# Patient Record
Sex: FEMALE | Race: OTHER | HISPANIC OR LATINO | URBAN - METROPOLITAN AREA
[De-identification: names, ages, dates, MRNs, and addresses within clinical notes are randomized per-mention and may not be internally consistent; named-entity substitution may affect disease eponyms.]

---

## 2017-10-23 ENCOUNTER — EMERGENCY (EMERGENCY)
Facility: HOSPITAL | Age: 32
LOS: 1 days | Discharge: HOME | End: 2017-10-23
Attending: EMERGENCY MEDICINE
Payer: MEDICAID

## 2017-10-23 VITALS
OXYGEN SATURATION: 99 % | TEMPERATURE: 99 F | SYSTOLIC BLOOD PRESSURE: 123 MMHG | HEART RATE: 94 BPM | DIASTOLIC BLOOD PRESSURE: 80 MMHG | RESPIRATION RATE: 18 BRPM

## 2017-10-23 VITALS
RESPIRATION RATE: 17 BRPM | SYSTOLIC BLOOD PRESSURE: 121 MMHG | HEART RATE: 99 BPM | TEMPERATURE: 98 F | WEIGHT: 139.99 LBS | DIASTOLIC BLOOD PRESSURE: 81 MMHG | HEIGHT: 63 IN | OXYGEN SATURATION: 98 %

## 2017-10-23 LAB
APPEARANCE UR: CLEAR — SIGNIFICANT CHANGE UP
BACTERIA # UR AUTO: ABNORMAL /HPF
BILIRUB UR-MCNC: NEGATIVE — SIGNIFICANT CHANGE UP
COLOR SPEC: YELLOW — SIGNIFICANT CHANGE UP
COMMENT - URINE: SIGNIFICANT CHANGE UP
DIFF PNL FLD: ABNORMAL
EPI CELLS # UR: SIGNIFICANT CHANGE UP /HPF
GLUCOSE UR QL: NEGATIVE — SIGNIFICANT CHANGE UP
HCG UR QL: NEGATIVE — SIGNIFICANT CHANGE UP
HYALINE CASTS # UR AUTO: ABNORMAL /LPF
KETONES UR-MCNC: ABNORMAL
LEUKOCYTE ESTERASE UR-ACNC: ABNORMAL
NITRITE UR-MCNC: NEGATIVE — SIGNIFICANT CHANGE UP
PH UR: 5 — SIGNIFICANT CHANGE UP (ref 5–8)
PROT UR-MCNC: 15
RBC CASTS # UR COMP ASSIST: ABNORMAL /HPF (ref 0–2)
SP GR SPEC: 1.02 — SIGNIFICANT CHANGE UP (ref 1.01–1.02)
UROBILINOGEN FLD QL: NEGATIVE — SIGNIFICANT CHANGE UP
WBC UR QL: SIGNIFICANT CHANGE UP /HPF (ref 0–5)

## 2017-10-23 PROCEDURE — 99285 EMERGENCY DEPT VISIT HI MDM: CPT

## 2017-10-23 PROCEDURE — 71020: CPT | Mod: 26

## 2017-10-23 PROCEDURE — 96372 THER/PROPH/DIAG INJ SC/IM: CPT

## 2017-10-23 PROCEDURE — 71046 X-RAY EXAM CHEST 2 VIEWS: CPT

## 2017-10-23 PROCEDURE — 99283 EMERGENCY DEPT VISIT LOW MDM: CPT | Mod: 25

## 2017-10-23 PROCEDURE — 81025 URINE PREGNANCY TEST: CPT

## 2017-10-23 PROCEDURE — 93005 ELECTROCARDIOGRAM TRACING: CPT

## 2017-10-23 PROCEDURE — 81001 URINALYSIS AUTO W/SCOPE: CPT

## 2017-10-23 RX ORDER — DIAZEPAM 5 MG
1 TABLET ORAL
Qty: 10 | Refills: 0 | OUTPATIENT
Start: 2017-10-23 | End: 2017-10-28

## 2017-10-23 RX ORDER — IBUPROFEN 200 MG
1 TABLET ORAL
Qty: 9 | Refills: 0 | OUTPATIENT
Start: 2017-10-23 | End: 2017-10-26

## 2017-10-23 RX ORDER — KETOROLAC TROMETHAMINE 30 MG/ML
30 SYRINGE (ML) INJECTION ONCE
Qty: 0 | Refills: 0 | Status: DISCONTINUED | OUTPATIENT
Start: 2017-10-23 | End: 2017-10-23

## 2017-10-23 RX ORDER — DIAZEPAM 5 MG
5 TABLET ORAL ONCE
Qty: 0 | Refills: 0 | Status: DISCONTINUED | OUTPATIENT
Start: 2017-10-23 | End: 2017-10-23

## 2017-10-23 RX ADMIN — Medication 5 MILLIGRAM(S): at 18:42

## 2017-10-23 RX ADMIN — Medication 30 MILLIGRAM(S): at 18:42

## 2017-10-23 NOTE — ED PROVIDER NOTE - MEDICAL DECISION MAKING DETAILS
33 y/o F presenting w/ L sided back pain and L arm numbness, w/ chest pain. Likely musculoskeletal pain, will check chest x-ray, EKG, give pain medication and re-assess.

## 2017-10-23 NOTE — ED PROVIDER NOTE - OBJECTIVE STATEMENT
33 y/o F w/ PMHx of asthma presents to ED c/o sudden on set L upper back pain w/ L arm numbness down to fingers, L face and jaw numbness x this morning. Pt also endorses some chest pain. Denies any history of similar symptoms. Pt denies sleeping on her arm or in a different position. NKDA.

## 2017-10-23 NOTE — ED ADULT TRIAGE NOTE - CHIEF COMPLAINT QUOTE
numbness on left face and left arm, started today around 6 am, similar episode happened almost 2 months ago. patient with clear speech. right abdominal pain with vomiting for 1 week.

## 2017-10-23 NOTE — ED ADULT NURSE NOTE - OBJECTIVE STATEMENT
Patient presented to er with c/o RUQ abdominal pain with vomiting x 10 days and numbness. Patient denies weakness.

## 2017-10-27 DIAGNOSIS — M79.602 PAIN IN LEFT ARM: ICD-10-CM

## 2017-10-27 DIAGNOSIS — J45.909 UNSPECIFIED ASTHMA, UNCOMPLICATED: ICD-10-CM

## 2017-10-27 DIAGNOSIS — M54.89 OTHER DORSALGIA: ICD-10-CM

## 2017-10-27 DIAGNOSIS — R07.9 CHEST PAIN, UNSPECIFIED: ICD-10-CM

## 2018-05-29 ENCOUNTER — EMERGENCY (EMERGENCY)
Facility: HOSPITAL | Age: 33
LOS: 1 days | Discharge: HOME | End: 2018-05-29
Attending: EMERGENCY MEDICINE
Payer: MEDICAID

## 2018-05-29 VITALS
WEIGHT: 130.07 LBS | HEIGHT: 63 IN | SYSTOLIC BLOOD PRESSURE: 124 MMHG | TEMPERATURE: 98 F | DIASTOLIC BLOOD PRESSURE: 82 MMHG | OXYGEN SATURATION: 99 % | RESPIRATION RATE: 20 BRPM | HEART RATE: 82 BPM

## 2018-05-29 LAB — HCG UR QL: NEGATIVE — SIGNIFICANT CHANGE UP

## 2018-05-29 PROCEDURE — 81025 URINE PREGNANCY TEST: CPT

## 2018-05-29 PROCEDURE — 71046 X-RAY EXAM CHEST 2 VIEWS: CPT

## 2018-05-29 PROCEDURE — 99282 EMERGENCY DEPT VISIT SF MDM: CPT

## 2018-05-29 PROCEDURE — 71046 X-RAY EXAM CHEST 2 VIEWS: CPT | Mod: 26

## 2018-05-29 PROCEDURE — 99283 EMERGENCY DEPT VISIT LOW MDM: CPT | Mod: 25

## 2018-05-29 RX ORDER — IBUPROFEN 200 MG
600 TABLET ORAL ONCE
Qty: 0 | Refills: 0 | Status: COMPLETED | OUTPATIENT
Start: 2018-05-29 | End: 2018-05-29

## 2018-05-29 RX ORDER — DIAZEPAM 5 MG
1 TABLET ORAL
Qty: 9 | Refills: 0 | OUTPATIENT
Start: 2018-05-29 | End: 2018-05-31

## 2018-05-29 RX ORDER — ACETAMINOPHEN 500 MG
650 TABLET ORAL ONCE
Qty: 0 | Refills: 0 | Status: COMPLETED | OUTPATIENT
Start: 2018-05-29 | End: 2018-05-29

## 2018-05-29 RX ORDER — IBUPROFEN 200 MG
1 TABLET ORAL
Qty: 20 | Refills: 0 | OUTPATIENT
Start: 2018-05-29 | End: 2018-06-02

## 2018-05-29 RX ORDER — DIAZEPAM 5 MG
5 TABLET ORAL ONCE
Qty: 0 | Refills: 0 | Status: DISCONTINUED | OUTPATIENT
Start: 2018-05-29 | End: 2018-05-29

## 2018-05-29 RX ADMIN — Medication 600 MILLIGRAM(S): at 11:24

## 2018-05-29 RX ADMIN — Medication 5 MILLIGRAM(S): at 11:24

## 2018-05-29 RX ADMIN — Medication 600 MILLIGRAM(S): at 12:06

## 2018-05-29 RX ADMIN — Medication 650 MILLIGRAM(S): at 12:06

## 2018-05-29 RX ADMIN — Medication 650 MILLIGRAM(S): at 11:24

## 2018-05-29 NOTE — ED PROVIDER NOTE - OBJECTIVE STATEMENT
33F no pmhx p/w right back pain x 2 days. Started after waking up. sharp pain. worse with shoulder ranging. worse with deep inspiration. no fall or trauma. no CP/SOb. no lightheadedness or dizziness. Had similar symptoms in past but less intense and self resolving. Patient tried oxycodone from a friend with mild relief. no N/V. LMP 5/4/18. no smoking. no recent travel or sx. no leg symptoms. no OCP.

## 2018-05-29 NOTE — ED PROVIDER NOTE - MEDICAL DECISION MAKING DETAILS
33F p/w atraumtic right scauplar pain. s/sx consistent with MSK etiology. will get XR to evaluate for underlying lung pathology. PERC negative. UCg.

## 2018-05-29 NOTE — ED PROVIDER NOTE - MUSCULOSKELETAL MINIMAL EXAM
right back pain. palpable scapular pain on palpation. right upper extrmeity FROM. neurovascularly intact. pulses intact. skin is intact with evidence of trauma or infection.

## 2018-10-01 ENCOUNTER — OUTPATIENT (OUTPATIENT)
Dept: OUTPATIENT SERVICES | Facility: HOSPITAL | Age: 33
LOS: 1 days | End: 2018-10-01
Payer: MEDICAID

## 2018-10-01 PROCEDURE — G9001: CPT

## 2018-10-12 ENCOUNTER — EMERGENCY (EMERGENCY)
Facility: HOSPITAL | Age: 33
LOS: 1 days | Discharge: HOME | End: 2018-10-12
Attending: EMERGENCY MEDICINE
Payer: MEDICAID

## 2018-10-12 VITALS
HEIGHT: 63 IN | DIASTOLIC BLOOD PRESSURE: 78 MMHG | HEART RATE: 89 BPM | OXYGEN SATURATION: 99 % | TEMPERATURE: 99 F | RESPIRATION RATE: 16 BRPM | SYSTOLIC BLOOD PRESSURE: 114 MMHG | WEIGHT: 134.92 LBS

## 2018-10-12 LAB — HCG SERPL-ACNC: <1 MIU/ML — SIGNIFICANT CHANGE UP

## 2018-10-12 PROCEDURE — 99284 EMERGENCY DEPT VISIT MOD MDM: CPT | Mod: 25

## 2018-10-12 PROCEDURE — 96375 TX/PRO/DX INJ NEW DRUG ADDON: CPT

## 2018-10-12 PROCEDURE — 96372 THER/PROPH/DIAG INJ SC/IM: CPT | Mod: XU

## 2018-10-12 PROCEDURE — 99285 EMERGENCY DEPT VISIT HI MDM: CPT | Mod: 25

## 2018-10-12 PROCEDURE — 96374 THER/PROPH/DIAG INJ IV PUSH: CPT

## 2018-10-12 PROCEDURE — 84702 CHORIONIC GONADOTROPIN TEST: CPT

## 2018-10-12 RX ORDER — SODIUM CHLORIDE 9 MG/ML
1000 INJECTION INTRAMUSCULAR; INTRAVENOUS; SUBCUTANEOUS ONCE
Qty: 0 | Refills: 0 | Status: COMPLETED | OUTPATIENT
Start: 2018-10-12 | End: 2018-10-12

## 2018-10-12 RX ORDER — METOCLOPRAMIDE HCL 10 MG
10 TABLET ORAL ONCE
Qty: 0 | Refills: 0 | Status: COMPLETED | OUTPATIENT
Start: 2018-10-12 | End: 2018-10-12

## 2018-10-12 RX ORDER — SUMATRIPTAN SUCCINATE 4 MG/.5ML
6 INJECTION, SOLUTION SUBCUTANEOUS ONCE
Qty: 0 | Refills: 0 | Status: COMPLETED | OUTPATIENT
Start: 2018-10-12 | End: 2018-10-12

## 2018-10-12 RX ORDER — KETOROLAC TROMETHAMINE 30 MG/ML
30 SYRINGE (ML) INJECTION ONCE
Qty: 0 | Refills: 0 | Status: DISCONTINUED | OUTPATIENT
Start: 2018-10-12 | End: 2018-10-12

## 2018-10-12 RX ORDER — DIPHENHYDRAMINE HCL 50 MG
25 CAPSULE ORAL ONCE
Qty: 0 | Refills: 0 | Status: COMPLETED | OUTPATIENT
Start: 2018-10-12 | End: 2018-10-12

## 2018-10-12 RX ORDER — ACETAMINOPHEN 500 MG
1000 TABLET ORAL ONCE
Qty: 0 | Refills: 0 | Status: COMPLETED | OUTPATIENT
Start: 2018-10-12 | End: 2018-10-12

## 2018-10-12 RX ADMIN — Medication 25 MILLIGRAM(S): at 08:55

## 2018-10-12 RX ADMIN — Medication 30 MILLIGRAM(S): at 11:22

## 2018-10-12 RX ADMIN — SODIUM CHLORIDE 2000 MILLILITER(S): 9 INJECTION INTRAMUSCULAR; INTRAVENOUS; SUBCUTANEOUS at 08:54

## 2018-10-12 RX ADMIN — SUMATRIPTAN SUCCINATE 6 MILLIGRAM(S): 4 INJECTION, SOLUTION SUBCUTANEOUS at 11:22

## 2018-10-12 RX ADMIN — Medication 10 MILLIGRAM(S): at 08:54

## 2018-10-12 RX ADMIN — Medication 400 MILLIGRAM(S): at 08:54

## 2018-10-12 RX ADMIN — Medication 1000 MILLIGRAM(S): at 09:00

## 2018-10-12 RX ADMIN — Medication 1000 MILLIGRAM(S): at 09:24

## 2018-10-12 RX ADMIN — SUMATRIPTAN SUCCINATE 6 MILLIGRAM(S): 4 INJECTION, SOLUTION SUBCUTANEOUS at 10:52

## 2018-10-12 RX ADMIN — Medication 30 MILLIGRAM(S): at 10:52

## 2018-10-12 RX ADMIN — SODIUM CHLORIDE 1000 MILLILITER(S): 9 INJECTION INTRAMUSCULAR; INTRAVENOUS; SUBCUTANEOUS at 09:00

## 2018-10-12 NOTE — ED ADULT NURSE NOTE - OBJECTIVE STATEMENT
Pt states has frontal headache since last night, accompanied by nausea and vomited x 4  Denies trauma, dizziness, blurry vision.

## 2018-10-12 NOTE — ED PROVIDER NOTE - OBJECTIVE STATEMENT
33F h/o migraines mild intermittent asthma p/w 2d gradual onset over hours of severe constant diffuse HA worse b/l frontal a/w nausea few episodes nbnb emesis and photophobia similar to prior migraines. Pt didn't take any analgesia since onset bc she had nothing at home. Pt denies f/c, neck pain/stiffness, trauma, ac/ap tx, bleeding diatheses, abd pain, d/c/melena/brbpr, unexplained weight loss, family hx, change in vision/speech/gait, weakness/numbness, thunderclap onset, worst headache of life, etoh/drug use

## 2018-10-12 NOTE — ED ADULT NURSE NOTE - CHPI ED NUR SYMPTOMS NEG
no blurred vision/no change in level of consciousness/no confusion/no weakness/no loss of consciousness/no fever/no numbness/no dizziness

## 2018-10-12 NOTE — ED PROVIDER NOTE - NS ED ROS FT
Constitutional: - Fever, - Chills, - unexplained weight loss  Eyes: - Discharge, - Irritation, - Redness, - Visual changes, + Light sensitivity  EARS: - Ear Pain, - hearing loss  NOSE: - Congestion, - epistaxis  MOUTH/THROAT: - Vocal Changes, - Drooling, - Sore throat  NECK: - Lumps, - Stiffness, - Pain  CV: - Palpitations, - Chest Pain, - Edema   RESP:  - Cough, - Shortness of Breath, - Dyspnea on Exertion, - Wheezing  GI: - Diarrhea, - Constipation, - Bloody stools, +Vomiting, - Abdominal Pain  : - Dysuria, -Frequency, - Hematuria  MSK: - Myalgias, - focal weakness, - Injuries  SKIN: - Color change, - Rash  NEURO: - Change in behavior, - Dec. Alertness, + Headache, - Change in speech, - Seizure-like activity

## 2018-10-12 NOTE — ED ADULT NURSE NOTE - CAS EDN DISCHARGE ASSESSMENT
Awake/Alert and oriented to person, place and time/No adverse reaction to first time med in ED/Symptoms improved

## 2018-10-12 NOTE — ED PROVIDER NOTE - PHYSICAL EXAMINATION
PE:   GEN: Awake, alert, oriented, interactive, NAD, uncomfortable but well appearing.   HEAD: Atraumatic, normocephalic  EENT: Sclera white, conjunctiva pink, PERRLA, EOMI, no scleral injection, normal orophrynx, neck nontender with full ROM  CARDIAC: Reg rate and rhythm, S1,S2, no murmur/rub/gallop. Strong central and peripheral pulses, Brisk cap refill, no evident pedal edema.   RESP: Normal respiratory rate and effort, no resp distress, lungs cta b/l without accessory muscle use, wheeze, rhonchi, or rales.   ABD: soft, non-tender, nondistended  NEURO: AOx3, CN II-XII grossly intact without focal deficits, FTN wnl, strength sensation wnl   MSK: Moving all extremities spontaneously, no apparent deformities  SKIN: warm, dry

## 2018-10-12 NOTE — ED PROVIDER NOTE - MEDICAL DECISION MAKING DETAILS
2d gradual onset HA c/w prior HA she's intermittently had over many yrs without any red flags on hx, normal neuro exam, presentation not c/w SAH ICH meningitis IIH dural sinus thrombosis. r/o pregnancy, pain meds, ivf, reassess, likely dc with pcp and neuro f/u and return precautions.

## 2018-10-17 DIAGNOSIS — Z71.89 OTHER SPECIFIED COUNSELING: ICD-10-CM

## 2020-06-16 NOTE — ED ADULT NURSE NOTE - MUSCULOSKELETAL WDL
DISPLAY PLAN FREE TEXT Full range of motion of upper and lower extremities, no joint tenderness/swelling.

## 2022-03-30 NOTE — ED ADULT NURSE NOTE - HIV INFORMATION PROVIDED
[FreeTextEntry1] : chf/ HLD low salt diet , daily weights, yellow zones reviewed , c/w diuretic, BB , statin , CKD 4, not candidate for ACEi or ARB due to hyperkalemia in the past , cardio followup \par HTN- c/w current meds\par pcp/ cardio followup  Yes

## 2022-12-14 NOTE — ED ADULT TRIAGE NOTE - BP NONINVASIVE SYSTOLIC (MM HG)
Dr. Flores patient    LMP: 10/30/2022  MBT: O+      S/w patient- patient states that she had intermittent light brown vaginal spotting since . Patient states that this AM she began having bright red vaginal bleeding. Patient describes vaginal blood flow as light. Patient denies vaginal blood clots. Patient reports mild pelvic cramping yesterday. Patient denies pelvic pain or cramping today. Patient denies recent IC, heavy lifting, exercise, or straining with BM.        121

## 2023-07-03 NOTE — ED PROVIDER NOTE - GASTROINTESTINAL [-], MLM
no nausea Cyclophosphamide Counseling:  I discussed with the patient the risks of cyclophosphamide including but not limited to hair loss, hormonal abnormalities, decreased fertility, abdominal pain, diarrhea, nausea and vomiting, bone marrow suppression and infection. The patient understands that monitoring is required while taking this medication.

## 2024-09-03 ENCOUNTER — INPATIENT (INPATIENT)
Facility: HOSPITAL | Age: 39
LOS: 1 days | Discharge: HOME | DRG: 392 | End: 2024-09-05
Attending: INTERNAL MEDICINE | Admitting: INTERNAL MEDICINE
Payer: SELF-PAY

## 2024-09-03 VITALS
TEMPERATURE: 99 F | RESPIRATION RATE: 18 BRPM | DIASTOLIC BLOOD PRESSURE: 93 MMHG | HEIGHT: 64 IN | OXYGEN SATURATION: 97 % | HEART RATE: 105 BPM | WEIGHT: 180.56 LBS | SYSTOLIC BLOOD PRESSURE: 144 MMHG

## 2024-09-03 DIAGNOSIS — R11.10 VOMITING, UNSPECIFIED: ICD-10-CM

## 2024-09-03 DIAGNOSIS — J45.909 UNSPECIFIED ASTHMA, UNCOMPLICATED: ICD-10-CM

## 2024-09-03 DIAGNOSIS — R10.9 UNSPECIFIED ABDOMINAL PAIN: ICD-10-CM

## 2024-09-03 DIAGNOSIS — Z29.9 ENCOUNTER FOR PROPHYLACTIC MEASURES, UNSPECIFIED: ICD-10-CM

## 2024-09-03 LAB
ALBUMIN SERPL ELPH-MCNC: 4.4 G/DL — SIGNIFICANT CHANGE UP (ref 3.5–5)
ALP SERPL-CCNC: 77 U/L — SIGNIFICANT CHANGE UP (ref 40–120)
ALT FLD-CCNC: 31 U/L DA — SIGNIFICANT CHANGE UP (ref 10–60)
ANION GAP SERPL CALC-SCNC: 7 MMOL/L — SIGNIFICANT CHANGE UP (ref 5–17)
APPEARANCE UR: CLEAR — SIGNIFICANT CHANGE UP
AST SERPL-CCNC: 13 U/L — SIGNIFICANT CHANGE UP (ref 10–40)
BASOPHILS # BLD AUTO: 0.01 K/UL — SIGNIFICANT CHANGE UP (ref 0–0.2)
BASOPHILS NFR BLD AUTO: 0.1 % — SIGNIFICANT CHANGE UP (ref 0–2)
BILIRUB SERPL-MCNC: 0.4 MG/DL — SIGNIFICANT CHANGE UP (ref 0.2–1.2)
BILIRUB UR-MCNC: NEGATIVE — SIGNIFICANT CHANGE UP
BUN SERPL-MCNC: 12 MG/DL — SIGNIFICANT CHANGE UP (ref 7–18)
CALCIUM SERPL-MCNC: 9.6 MG/DL — SIGNIFICANT CHANGE UP (ref 8.4–10.5)
CHLORIDE SERPL-SCNC: 108 MMOL/L — SIGNIFICANT CHANGE UP (ref 96–108)
CO2 SERPL-SCNC: 25 MMOL/L — SIGNIFICANT CHANGE UP (ref 22–31)
COLOR SPEC: YELLOW — SIGNIFICANT CHANGE UP
CREAT SERPL-MCNC: 0.77 MG/DL — SIGNIFICANT CHANGE UP (ref 0.5–1.3)
DIFF PNL FLD: ABNORMAL
EGFR: 102 ML/MIN/1.73M2 — SIGNIFICANT CHANGE UP
EOSINOPHIL # BLD AUTO: 0 K/UL — SIGNIFICANT CHANGE UP (ref 0–0.5)
EOSINOPHIL NFR BLD AUTO: 0 % — SIGNIFICANT CHANGE UP (ref 0–6)
FLUAV AG NPH QL: SIGNIFICANT CHANGE UP
FLUBV AG NPH QL: SIGNIFICANT CHANGE UP
GLUCOSE SERPL-MCNC: 135 MG/DL — HIGH (ref 70–99)
GLUCOSE UR QL: NEGATIVE MG/DL — SIGNIFICANT CHANGE UP
HCG SERPL-ACNC: <1 MIU/ML — SIGNIFICANT CHANGE UP
HCT VFR BLD CALC: 40.1 % — SIGNIFICANT CHANGE UP (ref 34.5–45)
HGB BLD-MCNC: 13.9 G/DL — SIGNIFICANT CHANGE UP (ref 11.5–15.5)
IMM GRANULOCYTES NFR BLD AUTO: 0.4 % — SIGNIFICANT CHANGE UP (ref 0–0.9)
KETONES UR-MCNC: 15 MG/DL
LEUKOCYTE ESTERASE UR-ACNC: NEGATIVE — SIGNIFICANT CHANGE UP
LIDOCAIN IGE QN: 26 U/L — SIGNIFICANT CHANGE UP (ref 13–75)
LYMPHOCYTES # BLD AUTO: 0.54 K/UL — LOW (ref 1–3.3)
LYMPHOCYTES # BLD AUTO: 5.2 % — LOW (ref 13–44)
MCHC RBC-ENTMCNC: 31.9 PG — SIGNIFICANT CHANGE UP (ref 27–34)
MCHC RBC-ENTMCNC: 34.7 GM/DL — SIGNIFICANT CHANGE UP (ref 32–36)
MCV RBC AUTO: 92 FL — SIGNIFICANT CHANGE UP (ref 80–100)
MONOCYTES # BLD AUTO: 0.25 K/UL — SIGNIFICANT CHANGE UP (ref 0–0.9)
MONOCYTES NFR BLD AUTO: 2.4 % — SIGNIFICANT CHANGE UP (ref 2–14)
NEUTROPHILS # BLD AUTO: 9.54 K/UL — HIGH (ref 1.8–7.4)
NEUTROPHILS NFR BLD AUTO: 91.9 % — HIGH (ref 43–77)
NITRITE UR-MCNC: NEGATIVE — SIGNIFICANT CHANGE UP
NRBC # BLD: 0 /100 WBCS — SIGNIFICANT CHANGE UP (ref 0–0)
PH UR: 7 — SIGNIFICANT CHANGE UP (ref 5–8)
PLATELET # BLD AUTO: 230 K/UL — SIGNIFICANT CHANGE UP (ref 150–400)
POTASSIUM SERPL-MCNC: 3.7 MMOL/L — SIGNIFICANT CHANGE UP (ref 3.5–5.3)
POTASSIUM SERPL-SCNC: 3.7 MMOL/L — SIGNIFICANT CHANGE UP (ref 3.5–5.3)
PROT SERPL-MCNC: 7.8 G/DL — SIGNIFICANT CHANGE UP (ref 6–8.3)
PROT UR-MCNC: ABNORMAL MG/DL
RBC # BLD: 4.36 M/UL — SIGNIFICANT CHANGE UP (ref 3.8–5.2)
RBC # FLD: 11.7 % — SIGNIFICANT CHANGE UP (ref 10.3–14.5)
SARS-COV-2 RNA SPEC QL NAA+PROBE: SIGNIFICANT CHANGE UP
SODIUM SERPL-SCNC: 140 MMOL/L — SIGNIFICANT CHANGE UP (ref 135–145)
SP GR SPEC: 1.07 — HIGH (ref 1–1.03)
TROPONIN I, HIGH SENSITIVITY RESULT: <3 NG/L — SIGNIFICANT CHANGE UP
UROBILINOGEN FLD QL: 0.2 MG/DL — SIGNIFICANT CHANGE UP (ref 0.2–1)
WBC # BLD: 10.38 K/UL — SIGNIFICANT CHANGE UP (ref 3.8–10.5)
WBC # FLD AUTO: 10.38 K/UL — SIGNIFICANT CHANGE UP (ref 3.8–10.5)

## 2024-09-03 PROCEDURE — 74177 CT ABD & PELVIS W/CONTRAST: CPT | Mod: 26,MC

## 2024-09-03 PROCEDURE — 70450 CT HEAD/BRAIN W/O DYE: CPT | Mod: 26,MC

## 2024-09-03 PROCEDURE — 71045 X-RAY EXAM CHEST 1 VIEW: CPT | Mod: 26

## 2024-09-03 PROCEDURE — 99285 EMERGENCY DEPT VISIT HI MDM: CPT

## 2024-09-03 RX ORDER — METOCLOPRAMIDE HCL 5 MG
10 TABLET ORAL ONCE
Refills: 0 | Status: COMPLETED | OUTPATIENT
Start: 2024-09-03 | End: 2024-09-03

## 2024-09-03 RX ORDER — FAMOTIDINE 10 MG/ML
20 INJECTION INTRAVENOUS ONCE
Refills: 0 | Status: COMPLETED | OUTPATIENT
Start: 2024-09-03 | End: 2024-09-03

## 2024-09-03 RX ORDER — PANTOPRAZOLE SODIUM 40 MG
40 TABLET, DELAYED RELEASE (ENTERIC COATED) ORAL DAILY
Refills: 0 | Status: DISCONTINUED | OUTPATIENT
Start: 2024-09-03 | End: 2024-09-05

## 2024-09-03 RX ORDER — HALOPERIDOL 1 MG
5 TABLET ORAL ONCE
Refills: 0 | Status: COMPLETED | OUTPATIENT
Start: 2024-09-03 | End: 2024-09-03

## 2024-09-03 RX ORDER — ONDANSETRON 2 MG/ML
8 INJECTION, SOLUTION INTRAMUSCULAR; INTRAVENOUS EVERY 8 HOURS
Refills: 0 | Status: DISCONTINUED | OUTPATIENT
Start: 2024-09-03 | End: 2024-09-05

## 2024-09-03 RX ORDER — ENOXAPARIN SODIUM 100 MG/ML
40 INJECTION SUBCUTANEOUS EVERY 24 HOURS
Refills: 0 | Status: DISCONTINUED | OUTPATIENT
Start: 2024-09-03 | End: 2024-09-05

## 2024-09-03 RX ORDER — ACETAMINOPHEN 325 MG/1
650 TABLET ORAL EVERY 6 HOURS
Refills: 0 | Status: DISCONTINUED | OUTPATIENT
Start: 2024-09-03 | End: 2024-09-05

## 2024-09-03 RX ORDER — SODIUM CHLORIDE 9 MG/ML
2000 INJECTION INTRAMUSCULAR; INTRAVENOUS; SUBCUTANEOUS ONCE
Refills: 0 | Status: COMPLETED | OUTPATIENT
Start: 2024-09-03 | End: 2024-09-03

## 2024-09-03 RX ADMIN — Medication 104 MILLIGRAM(S): at 11:18

## 2024-09-03 RX ADMIN — FAMOTIDINE 20 MILLIGRAM(S): 10 INJECTION INTRAVENOUS at 11:18

## 2024-09-03 RX ADMIN — SODIUM CHLORIDE 2000 MILLILITER(S): 9 INJECTION INTRAMUSCULAR; INTRAVENOUS; SUBCUTANEOUS at 11:18

## 2024-09-03 RX ADMIN — Medication 5 MILLIGRAM(S): at 12:19

## 2024-09-03 NOTE — ED ADULT TRIAGE NOTE - INTERNATIONAL TRAVEL
----- Message from CINTHIA Quan sent at 10/25/2021  8:13 PM EDT -----  Good control of DM  Poor control of Lipids - LDL should be less than 70  
Pt's wife Stephon advised (hipaa)  
No

## 2024-09-03 NOTE — ED PROVIDER NOTE - PHYSICAL EXAMINATION
Afebrile, hemodynamically stable, saturating well on room air  Initially noted to be retching, on reassessment appears to be sleeping, opens eyes but frequently closes them during exam, no WOB  Head NCAT  Pupils equal, EOMI, anicteric  MMM  RRR, nml S1/S2, no m/r/g  Lungs CTAB, no w/r/r  Abd soft, mild epigastric TTP, ND, nml BS, no rebound or guarding, negative Fierro's  AAO, CN's 3-12 grossly intact, motor 5/5 in all extrems  URIBE spontaneously, no leg cyanosis or edema or calf tenderness, 2+ radial pulses  Skin warm, dry, no rashes or hives

## 2024-09-03 NOTE — ED ADULT NURSE NOTE - IN ACCORDANCE WITH [place] LAW, WE OFFER EVERY PATIENT A HEPATITIS C TEST. WOULD YOU LIKE TO BE TESTED TODAY?
Opt out Secondary Intention Text (Leave Blank If You Do Not Want): The defect will heal with secondary intention.

## 2024-09-03 NOTE — SBIRT NOTE ADULT - NSSBIRTDRGBRIEFINTDET_GEN_A_CORE
Patient was not provided with a referral to substance use treatment because not aligned with patient’s goals, patient currently receiving other medical care. Patient provided with Remote SBIRT information.   Patient was not provided with a referral to substance use treatment because not aligned with patient’s goals, patient currently receiving other medical care. Patient's substance use will be addressed during their inpatient admission. Patient provided with Remote SBIRT information.   Patient was not provided with a referral to substance use treatment because patient currently receiving other medical care. Patient's substance use will be addressed during their inpatient admission. Patient provided with Remote SBIRT information.

## 2024-09-03 NOTE — ED ADULT NURSE NOTE - NSFALLUNIVINTERV_ED_ALL_ED
Bed/Stretcher in lowest position, wheels locked, appropriate side rails in place/Call bell, personal items and telephone in reach/Instruct patient to call for assistance before getting out of bed/chair/stretcher/Non-slip footwear applied when patient is off stretcher/[place] to call system/Physically safe environment - no spills, clutter or unnecessary equipment/Purposeful proactive rounding/Room/bathroom lighting operational, light cord in reach

## 2024-09-03 NOTE — H&P ADULT - PROBLEM SELECTOR PLAN 1
p/w n/v diarrhea for one day   vitals wnl   labs wnl   CT A/P Colonic wall thickening.  likely 2/2 to  Cannibis use vs colitis.   s/p 2L haldol, reglan and zofran  c/w zofran   c/w PPI  c/w iv fluids  c/w clear diet advance as tolerated  monitor sx

## 2024-09-03 NOTE — H&P ADULT - ASSESSMENT
40 y/o female, AAOx3 with a history of Astham, regular use of THC presents for abdominal pain, vomiting and diarrhea.  CT A/P Colonic wall thickening. Patient is begin admitted for intractable vomiting likely 2/2 to Cannibis use vs colitis.    38 y/o female, AAOx3 with a history of Asthma, regular use of THC presents for abdominal pain, vomiting and diarrhea.  CT A/P Colonic wall thickening. Patient is begin admitted for intractable vomiting likely 2/2 to Cannibis use vs colitis.

## 2024-09-03 NOTE — H&P ADULT - HISTORY OF PRESENT ILLNESS
Adult 38 y/o female, AAOx3 with a history of Asthma regular use of THC presents for abdominal pain, vomiting and diarrhea.  Patient is lethargic on exam and dismissive History obtained from Mother by bedside. Mother reports patient  had multiple episodes of nonbloody yellow-colored emesis as well as multiple episodes of nonbloody diarrhea since yesterday. Associated periumbilical nondilating abdominal cramping. Unable to obtain any further information.       To note: ED reports the patient reported THC use however, on my exam she said no.

## 2024-09-03 NOTE — ED PROVIDER NOTE - IV ALTEPLASE EXCL REL HIDDEN
show CV: S1,S2  Pulmonary: Clear to auscultation bilaterally    Abdomen: Gravid abdomen  Extremities: Nontender to palpation bilaterally     EFM: 150hr, moderate variability, +accelerations, -decelerations   TOCO: Contractions q2-3 minutes   SVE: 5.5/70/-3  BSUS: Vertex

## 2024-09-03 NOTE — PATIENT PROFILE ADULT - FALL HARM RISK - RISK INTERVENTIONS

## 2024-09-03 NOTE — ED PROVIDER NOTE - CLINICAL SUMMARY MEDICAL DECISION MAKING FREE TEXT BOX
Patient with chronic unchanged chest pain and low suspicion for acute cardiopulmonary process. ECG/troponin unremarkable. No evidence of DVT or acute risk factors for this. No evidence of dissection. Character and appearance low suspicion for ovarian torsion to warrant ultrasound imaging. Abdomen benign and nonperitoneal and low suspicion for acute intra-abdominal process to warrant surgical consult, and CT _________. Character low suspicion for UTI/pyelonephritis/kidney stone _______. Symptoms may be viral in etiology by context, versus related to THC. Electrolytes unremarkable and does not appear significantly dehydrated to warrant admission at this time. Given IV fluids, Reglan with Patient with chronic unchanged chest pain and low suspicion for acute cardiopulmonary process. ECG/troponin unremarkable. No evidence of DVT or acute risk factors for this. No evidence of dissection. Character and appearance low suspicion for ovarian torsion to warrant ultrasound imaging. Abdomen benign and nonperitoneal and low suspicion for acute intra-abdominal process to warrant surgical consult, and CT unremarkable other than possible colitis - higher suspicion against this on my opinion. Character low suspicion for UTI/pyelonephritis/kidney stone and UA unremarkable. Symptoms may be viral in etiology by context, versus related to THC. Electrolytes unremarkable and does not appear significantly dehydrated to warrant admission at this time. Given IV fluids, Reglan, then Haldol with improvement and no more vomiting however still with intractable nausea and weakness and despite going to bathroom on her return intractable weakness and barely able to interact again. Admitted for further management, hydration.

## 2024-09-03 NOTE — ED PROVIDER NOTE - AVIAN FLU SYMPTOMS
Problem List Items Addressed This Visit          Cardiac/Vascular    HTN (hypertension) - Primary    Overview     -at goal today  - Current Hypertension Medications:   Hypertension Medications               amLODIPine-benazepriL (LOTREL) 5-40 mg per capsule TAKE 1 CAPSULE BY MOUTH EVERY DAY    doxazosin (CARDURA) 4 MG tablet Take 4 mg by mouth every evening.    hydroCHLOROthiazide (MICROZIDE) 12.5 mg capsule TAKE 1 CAPSULES BY MOUTH EVERY DAY    metoprolol succinate (TOPROL-XL) 100 MG 24 hr tablet TAKE 1 TABLET BY MOUTH EVERY DAY   -continue lifestyle modification with low sodium diet and exercise   -discussed hypertension disease course and importance of treating high blood pressure  -patient understood and advised of risk of untreated blood pressure.  ER precautions were given   for symptoms of hypertensive urgency and emergency.             Relevant Orders    Comprehensive Metabolic Panel    CBC Auto Differential    TSH    Lipid Panel    Hemoglobin A1C       Oncology    Prostate cancer    Overview     Prostate cancer dxd 2016. Follows with jacob Wagner, monitoring. Denies hx of RT and chemotherapy   MRI prostate 2022:   IMPRESSION:  1.  No change.  2.  PIRADS 2 - Low (clinically significant cancer is unlikely to be present).  3. Minimal bladder wall trabeculation.    Reports terminal dribbling, is on alfuzosin am and doxazosin pm.         Relevant Orders    PSA, Screening       GI    History of hepatitis C    Relevant Orders    Comprehensive Metabolic Panel    CBC Auto Differential    TSH    Lipid Panel    Hemoglobin A1C    Chronic hepatitis C without hepatic coma     Other Visit Diagnoses       Hyperlipidemia, unspecified hyperlipidemia type        Relevant Orders    Comprehensive Metabolic Panel    CBC Auto Differential    TSH    Lipid Panel    Hemoglobin A1C    Encounter for screening for malignant neoplasm of prostate        Relevant Orders    PSA, Screening    Encounter for long-term current use  of medication        Relevant Orders    Comprehensive Metabolic Panel    CBC Auto Differential    TSH    Lipid Panel    Hemoglobin A1C    PSA, Screening                Patient ID: Lloyd Williamson is a 65 y.o. male.    Chief Complaint:  follow up     Has a hx of  has a past medical history of Asthma, DDD (degenerative disc disease), lumbar, GERD (gastroesophageal reflux disease), Hepatitis, Hepatitis C, Hypertension, Prostate cancer, and Seasonal allergic rhinitis (8/17/2020).     Prostate cancer dxd 2016. Follows with Dr. Tomlin, monitoring. Denies hx of RT and chemotherapy. Reports terminal dribbling, is on alfuzosin am and doxazosin pm.    Hep C s/p curative therapy 2003    Hx of cataracts and glaucoma. Follows with Dr. Musa, optometry. Stable on latanoprost.     Overactive bladder       Colonoscopy: 2017, due in 2027     Health Maintenance Topics with due status: Not Due       Topic Last Completion Date    TETANUS VACCINE 04/02/2018    Colorectal Cancer Screening 01/20/2020    Pneumococcal Vaccines (Age 0-64) 02/03/2020    Lipid Panel 07/06/2022        ==============================================  History reviewed.   Health Maintenance Due   Topic Date Due    Hemoglobin A1c (Diabetic Prevention Screening)  Never done         Past Medical History:  Past Medical History:   Diagnosis Date    Asthma     DDD (degenerative disc disease), lumbar     GERD (gastroesophageal reflux disease)     Hepatitis     Hepatitis C     Hypertension     Prostate cancer     Seasonal allergic rhinitis 8/17/2020     Past Surgical History:   Procedure Laterality Date    COLONOSCOPY      COLONOSCOPY N/A 1/20/2020    Procedure: COLONOSCOPY;  Surgeon: He Vigil MD;  Location: [facility];  Service: Endoscopy;  Laterality: N/A;    ESOPHAGOGASTRODUODENOSCOPY      EXTRACTION OF TOOTH       Review of patient's allergies indicates:  No Known Allergies  Current Outpatient Medications on File Prior to Visit   Medication Sig Dispense  Refill    albuterol (PROVENTIL/VENTOLIN HFA) 90 mcg/actuation inhaler Inhale 2 puffs into the lungs every 6 (six) hours as needed for Wheezing. Rescue 18 g 5    amLODIPine-benazepriL (LOTREL) 5-40 mg per capsule TAKE 1 CAPSULE BY MOUTH EVERY DAY 90 capsule 1    coffee xt/phosphatidyl serine (NEURIVA ORIGINAL ORAL) Take by mouth.      doxazosin (CARDURA) 4 MG tablet Take 4 mg by mouth every evening.      fluticasone propionate (FLONASE) 50 mcg/actuation nasal spray 2 sprays (100 mcg total) by Each Nostril route once daily. 48 g 5    hydroCHLOROthiazide (MICROZIDE) 12.5 mg capsule TAKE 1 CAPSULES BY MOUTH EVERY DAY 90 capsule 3    metoprolol succinate (TOPROL-XL) 100 MG 24 hr tablet TAKE 1 TABLET BY MOUTH EVERY DAY 90 tablet 3    [DISCONTINUED] alfuzosin (UROXATRAL) 10 mg Tb24 Take 1 tablet (10 mg total) by mouth after dinner. (Patient taking differently: Take 10 mg by mouth daily with breakfast.) 90 tablet 3    latanoprost 0.005 % ophthalmic solution Place 1 drop into both eyes every evening. 2.5 mL 11    [DISCONTINUED] azithromycin (Z-ALFREDO) 250 MG tablet Take 2 tablets by mouth on day 1; Take 1 tablet by mouth on days 2-5 (Patient not taking: Reported on 3/8/2023) 6 tablet 0    [DISCONTINUED] triamcinolone (KENALOG) 0.5 % ointment Apply topically 3 (three) times daily. 15 g 3     No current facility-administered medications on file prior to visit.     Social History     Socioeconomic History    Marital status: Single   Tobacco Use    Smoking status: Former     Types: Cigarettes     Quit date: 10/7/1986     Years since quittin.4    Smokeless tobacco: Never    Tobacco comments:     pt stop smoking 40yrs    Substance and Sexual Activity    Alcohol use: Yes     Comment: rare    Drug use: Never     Family History   Problem Relation Age of Onset    Heart attack Mother     Cancer Father     Cancer Sister     Cancer Brother         prostate    Diabetes Brother     Cancer Maternal Grandfather         prostate    Liver  disease Brother     No Known Problems Sister     No Known Problems Sister     No Known Problems Sister     Cancer Maternal Grandmother           Review of Systems   12 point review of systems per hpi, otherwise negative         Objective:    Nursing note and vitals reviewed.  Vitals:    03/08/23 0818   BP: 123/76   Pulse: 61   Temp: 97.5 °F (36.4 °C)     Body mass index is 27.32 kg/m².     Physical Exam   Constitutional: oriented to person, place, and time. well-developed and well-nourished. No distress.   HENT: WNL  Head: Normocephalic and atraumatic.   Eyes: EOM are normal.   Neck: Normal range of motion. Neck supple.   Cardiovascular: Normal rate  Pulmonary/Chest: Effort normal. No respiratory distress.   GI: soft, non distended, no ttp, no rebound/guarding  Musculoskeletal: Normal range of motion. no edema.   Neurological: CN II-XII intact  Skin: warm and dry.   Psychiatric: normal mood and affect. behavior is normal.             Sherlyn Brambila MD    We Offer Telehealth & Same Day Appointments!   Book your Telehealth appointment with me through my nurse or   Clinic appointments on [email or web address]!  [phone]     To Schedule appointments online, go to [facility]: [email or web address]            No

## 2024-09-03 NOTE — H&P ADULT - ATTENDING COMMENTS
History of Present Illness:   38 y/o female, AAOx3 with a history of Asthma regular use of THC presents for abdominal pain, vomiting and diarrhea.  Patient is lethargic on exam and dismissive History obtained from Mother by bedside. Mother reports patient  had multiple episodes of nonbloody yellow-colored emesis as well as multiple episodes of nonbloody diarrhea since yesterday. Associated periumbilical nondilating abdominal cramping. Unable to obtain any further information.   seen and examined vsstable afebrile physical done on bed comfortable  c/o mild back pain  franklin bd pain    non tenderness    ls spine non tender  staright leg raising mild stiffness b/l   a/p admitetd  for abd pain diarrhea  they all stopped  pt has LBP x 2 years  taking on and off nsaid   and drinks etoh    ct ABD NOTED   GI    xray lsspine     URINE TOX

## 2024-09-03 NOTE — H&P ADULT - NSHPPHYSICALEXAM_GEN_ALL_CORE
T(C): 37.2 (09-03-24 @ 16:27), Max: 37.2 (09-03-24 @ 16:27)  HR: 100 (09-03-24 @ 16:27) (98 - 105)  BP: 137/86 (09-03-24 @ 16:27) (132/79 - 144/93)  RR: 20 (09-03-24 @ 16:27) (17 - 20)  SpO2: 98% (09-03-24 @ 16:27) (96% - 99%)    CONSTITUTIONAL: Well groomed, no apparent distress  EYES: PERRLA and symmetric, EOMI, No conjunctival or scleral injection, non-icteric  RESP: No respiratory distress, no use of accessory muscles; CTA b/l, no WRR  CV: RRR, +S1S2, no MRG; no JVD; no peripheral edema  GI: Soft, NT, ND, no rebound, no guarding; no palpable masses; no hepatosplenomegaly; no hernia palpated  LYMPH: No cervical LAD or tenderness; no axillary LAD or tenderness; no inguinal LAD or tenderness  SKIN: No rashes or ulcers noted; no subcutaneous nodules or induration palpable  NEURO: CN II-XII intact; normal reflexes in upper and lower extremities, sensation intact in upper and lower extremities b/l to light touch

## 2024-09-04 DIAGNOSIS — M54.50 LOW BACK PAIN, UNSPECIFIED: ICD-10-CM

## 2024-09-04 LAB
AMPHET UR-MCNC: NEGATIVE — SIGNIFICANT CHANGE UP
BARBITURATES UR SCN-MCNC: NEGATIVE — SIGNIFICANT CHANGE UP
BASOPHILS # BLD AUTO: 0.03 K/UL — SIGNIFICANT CHANGE UP (ref 0–0.2)
BASOPHILS NFR BLD AUTO: 0.3 % — SIGNIFICANT CHANGE UP (ref 0–2)
BENZODIAZ UR-MCNC: NEGATIVE — SIGNIFICANT CHANGE UP
COCAINE METAB.OTHER UR-MCNC: NEGATIVE — SIGNIFICANT CHANGE UP
EOSINOPHIL # BLD AUTO: 0 K/UL — SIGNIFICANT CHANGE UP (ref 0–0.5)
EOSINOPHIL NFR BLD AUTO: 0 % — SIGNIFICANT CHANGE UP (ref 0–6)
HCT VFR BLD CALC: 39.2 % — SIGNIFICANT CHANGE UP (ref 34.5–45)
HGB BLD-MCNC: 13.7 G/DL — SIGNIFICANT CHANGE UP (ref 11.5–15.5)
IMM GRANULOCYTES NFR BLD AUTO: 0.5 % — SIGNIFICANT CHANGE UP (ref 0–0.9)
LIDOCAIN IGE QN: 31 U/L — SIGNIFICANT CHANGE UP (ref 13–75)
LYMPHOCYTES # BLD AUTO: 0.88 K/UL — LOW (ref 1–3.3)
LYMPHOCYTES # BLD AUTO: 7.9 % — LOW (ref 13–44)
MAGNESIUM SERPL-MCNC: 2.4 MG/DL — SIGNIFICANT CHANGE UP (ref 1.6–2.6)
MCHC RBC-ENTMCNC: 32.2 PG — SIGNIFICANT CHANGE UP (ref 27–34)
MCHC RBC-ENTMCNC: 34.9 GM/DL — SIGNIFICANT CHANGE UP (ref 32–36)
MCV RBC AUTO: 92 FL — SIGNIFICANT CHANGE UP (ref 80–100)
METHADONE UR-MCNC: NEGATIVE — SIGNIFICANT CHANGE UP
MONOCYTES # BLD AUTO: 0.4 K/UL — SIGNIFICANT CHANGE UP (ref 0–0.9)
MONOCYTES NFR BLD AUTO: 3.6 % — SIGNIFICANT CHANGE UP (ref 2–14)
NEUTROPHILS # BLD AUTO: 9.72 K/UL — HIGH (ref 1.8–7.4)
NEUTROPHILS NFR BLD AUTO: 87.7 % — HIGH (ref 43–77)
NRBC # BLD: 0 /100 WBCS — SIGNIFICANT CHANGE UP (ref 0–0)
OPIATES UR-MCNC: NEGATIVE — SIGNIFICANT CHANGE UP
PCP SPEC-MCNC: SIGNIFICANT CHANGE UP
PCP UR-MCNC: NEGATIVE — SIGNIFICANT CHANGE UP
PHOSPHATE SERPL-MCNC: 1.8 MG/DL — LOW (ref 2.5–4.5)
PLATELET # BLD AUTO: 211 K/UL — SIGNIFICANT CHANGE UP (ref 150–400)
RBC # BLD: 4.26 M/UL — SIGNIFICANT CHANGE UP (ref 3.8–5.2)
RBC # FLD: 12 % — SIGNIFICANT CHANGE UP (ref 10.3–14.5)
THC UR QL: POSITIVE
WBC # BLD: 11.08 K/UL — HIGH (ref 3.8–10.5)
WBC # FLD AUTO: 11.08 K/UL — HIGH (ref 3.8–10.5)

## 2024-09-04 PROCEDURE — 99223 1ST HOSP IP/OBS HIGH 75: CPT

## 2024-09-04 PROCEDURE — 72100 X-RAY EXAM L-S SPINE 2/3 VWS: CPT | Mod: 26

## 2024-09-04 RX ORDER — SODIUM PHOSPHATE, DIBASIC, ANHYDROUS, POTASSIUM PHOSPHATE, MONOBASIC, AND SODIUM PHOSPHATE, MONOBASIC, MONOHYDRATE 852; 155; 130 MG/1; MG/1; MG/1
1 TABLET, COATED ORAL ONCE
Refills: 0 | Status: COMPLETED | OUTPATIENT
Start: 2024-09-04 | End: 2024-09-04

## 2024-09-04 RX ORDER — METRONIDAZOLE 250 MG
500 TABLET ORAL EVERY 8 HOURS
Refills: 0 | Status: DISCONTINUED | OUTPATIENT
Start: 2024-09-04 | End: 2024-09-05

## 2024-09-04 RX ORDER — LIDOCAINE/BENZALKONIUM/ALCOHOL
1 SOLUTION, NON-ORAL TOPICAL EVERY 24 HOURS
Refills: 0 | Status: DISCONTINUED | OUTPATIENT
Start: 2024-09-04 | End: 2024-09-05

## 2024-09-04 RX ADMIN — ACETAMINOPHEN 650 MILLIGRAM(S): 325 TABLET ORAL at 00:19

## 2024-09-04 RX ADMIN — Medication 500 MILLIGRAM(S): at 22:09

## 2024-09-04 RX ADMIN — ONDANSETRON 8 MILLIGRAM(S): 2 INJECTION, SOLUTION INTRAMUSCULAR; INTRAVENOUS at 00:40

## 2024-09-04 RX ADMIN — SODIUM PHOSPHATE, DIBASIC, ANHYDROUS, POTASSIUM PHOSPHATE, MONOBASIC, AND SODIUM PHOSPHATE, MONOBASIC, MONOHYDRATE 1 PACKET(S): 852; 155; 130 TABLET, COATED ORAL at 13:35

## 2024-09-04 RX ADMIN — Medication 500 MILLIGRAM(S): at 13:35

## 2024-09-04 RX ADMIN — ACETAMINOPHEN 650 MILLIGRAM(S): 325 TABLET ORAL at 01:00

## 2024-09-04 RX ADMIN — ENOXAPARIN SODIUM 40 MILLIGRAM(S): 100 INJECTION SUBCUTANEOUS at 06:01

## 2024-09-04 RX ADMIN — Medication 90 MILLILITER(S): at 06:01

## 2024-09-04 RX ADMIN — Medication 100 MILLIGRAM(S): at 13:36

## 2024-09-04 RX ADMIN — Medication 40 MILLIGRAM(S): at 13:35

## 2024-09-04 NOTE — CONSULT NOTE ADULT - NS ATTEND AMEND GEN_ALL_CORE FT
39 F asthma, THC here with ?viral gastroenteritis.   Agree with supportive care and stool studies.   Adv diet as tolerated.

## 2024-09-04 NOTE — CONSULT NOTE ADULT - SUBJECTIVE AND OBJECTIVE BOX
[  ] STAT REQUEST              [ X ] ROUTINE REQUEST    Patient is a 39 year old Female with colitis. GI consulted to evaluate.       HPI:  38 y/o female, AAOx3 with a history of Asthma regular use of THC presents for abdominal pain, vomiting and diarrhea.  Patient is lethargic on exam and dismissive History obtained from Mother by bedside. Mother reports patient  had multiple episodes of nonbloody yellow-colored emesis as well as multiple episodes of nonbloody diarrhea since yesterday. Associated periumbilical nondilating abdominal cramping. Unable to obtain any further information.          PAIN MANAGEMENT:  Pain Scale:                 0/10  Pain Location:         PAST MEDICAL HISTORY     Asthma        PAST SURGICAL HISTORY    No significant past surgical history reported        Allergies    No Known Allergies    Intolerances  None         MEDICATIONS  (STANDING):  cefTRIAXone   IVPB 1000 milliGRAM(s) IV Intermittent every 24 hours  enoxaparin Injectable 40 milliGRAM(s) SubCutaneous every 24 hours  lactated ringers. 1000 milliLiter(s) (90 mL/Hr) IV Continuous <Continuous>  metroNIDAZOLE    Tablet 500 milliGRAM(s) Oral every 8 hours  pantoprazole  Injectable 40 milliGRAM(s) IV Push daily  potassium phosphate / sodium phosphate Powder (PHOS-NaK) 1 Packet(s) Oral once    MEDICATIONS  (PRN):  acetaminophen     Tablet .. 650 milliGRAM(s) Oral every 6 hours PRN Temp greater or equal to 38C (100.4F), Mild Pain (1 - 3)  ondansetron Injectable 8 milliGRAM(s) IV Push every 8 hours PRN Nausea and/or Vomiting      SOCIAL HISTORY  Advanced Directives:       [ X ] Full Code       [  ] DNR  Marital Status:         [  ] M      [ X ] S      [  ] D       [  ] W  Children:       [  ] Yes      [ X ] No  Occupation:        [  ] Employed       [ X ] Unemployed       [  ] Retired  Diet:       [ X ] Regular       [  ] PEG feeding          [  ] NG tube feeding  Drug Use:           [  ] Patient denied          [X  ] Yes  Alcohol:           [ X ] No             [  ] Yes (socially)         [  ] Yes (chronic)  Tobacco:           [  ] Yes           [ X ] No      FAMILY HISTORY  [ X ] Heart Disease            [ X ] Diabetes             [ X ] HTN             [  ] Colon Cancer             [  ] Stomach Cancer              [  ] Pancreatic Cancer      VITAL SIGNS   Vital Signs Last 24 Hrs  T(C): 36.8 (09-04-24 @ 12:00), Max: 37.2 (09-03-24 @ 16:27)  T(F): 98.3 (09-04-24 @ 12:00), Max: 99 (09-03-24 @ 16:27)  HR: 86 (09-04-24 @ 12:00) (81 - 100)  BP: 127/83 (09-04-24 @ 12:00) (102/60 - 149/91)   RR: 18 (09-04-24 @ 12:00) (17 - 20)  SpO2: 98% (09-04-24 @ 12:00) (95% - 99%)           CBC Full  -  ( 04 Sep 2024 06:00 )  WBC Count : 11.08 K/uL  RBC Count : 4.26 M/uL  Hemoglobin : 13.7 g/dL  Hematocrit : 39.2 %  Platelet Count - Automated : 211 K/uL  Mean Cell Volume : 92.0 fl  Mean Cell Hemoglobin : 32.2 pg  Mean Cell Hemoglobin Concentration : 34.9 gm/dL  Auto Neutrophil # : 9.72 K/uL  Auto Lymphocyte # : 0.88 K/uL  Auto Monocyte # : 0.40 K/uL  Auto Eosinophil # : 0.00 K/uL  Auto Basophil # : 0.03 K/uL  Auto Neutrophil % : 87.7 %  Auto Lymphocyte % : 7.9 %  Auto Monocyte % : 3.6 %  Auto Eosinophil % : 0.0 %  Auto Basophil % : 0.3 %      09-04    140  |  108  |  10  ----------------------------<  98  3.3<L>   |  26  |  0.67    Ca    8.8      04 Sep 2024 06:00  Phos  1.8     09-04  Mg     2.4     09-04    TPro  7.8  /  Alb  4.4  /  TBili  0.4  /  DBili  x   /  AST  13  /  ALT  31  /  AlkPhos  77  09-03    Lipase: 31 U/L (09-04 @ 06:00)       Urinalysis with Rflx Culture (09.03.24 @ 15:10)   Urine Appearance: Clear  Color: Yellow  Specific Gravity: 1.071  pH Urine: 7.0  Protein, Urine: Trace mg/dL  Glucose Qualitative, Urine: Negative mg/dL  Ketone - Urine: 15 mg/dL  Blood, Urine: Small  Bilirubin: Negative  Urobilinogen: 0.2 mg/dL  Leukocyte Esterase Concentration: Negative  Nitrite: Negative      RADIOLOGY/IMAGING                  ACC: [id number] EXAM:  CT ABDOMEN AND PELVIS IC   ORDERED BY: MORENO HARRISON     PROCEDURE DATE:  09/03/2024          INTERPRETATION:  CLINICAL INFORMATION: Abdominal pain    COMPARISON: None.    CONTRAST/COMPLICATIONS:  IV Contrast: Omnipaque 350  90 cc administered   10 cc discarded  Oral Contrast: NONE  Complications: None reported at time of study completion    PROCEDURE:  CT of the Abdomen and Pelvis was performed.  Sagittal and coronal reformats were performed.    FINDINGS:  LOWER CHEST: Basilar atelectasis.    LIVER: Within normal limits.  BILE DUCTS: Normal caliber.  GALLBLADDER: Within normal limits.  SPLEEN: Within normal limits.  PANCREAS: Within normal limits.  ADRENALS: Within normal limits.  KIDNEYS/URETERS: Subcentimeter right renal hypodensity, too small to   further characterize. No hydronephrosis.    BLADDER: Within normal limits.  REPRODUCTIVE ORGANS: Uterus and adnexa within normal limits.    BOWEL: Hiatal hernia. No bowel obstruction. Colonic diverticulosis.   Appendix is absent. Diffuse colonic wall thickening.  PERITONEUM/RETROPERITONEUM: Within normal limits.  VESSELS: Within normal limits.  LYMPH NODES: No lymphadenopathy.  ABDOMINAL WALL: Within normal limits.  BONES: Within normal limits.    IMPRESSION:  Colonic wall thickening, question underdistention or colitis.             [  ] STAT REQUEST              [ X ] ROUTINE REQUEST    Patient is a 39 year old Female with colitis. GI consulted to evaluate.       HPI:  38 y/o female, AAOx3 with a history of Asthma regular use of THC presents for abdominal pain, vomiting and diarrhea.  Patient is lethargic on exam and dismissive History obtained from Mother by bedside. Mother reports patient  had multiple episodes of nonbloody yellow-colored emesis as well as multiple episodes of nonbloody diarrhea since yesterday. Associated periumbilical nondilating abdominal cramping. Unable to obtain any further information.          PAIN MANAGEMENT:  Pain Scale:                 0/10  Pain Location:         PAST MEDICAL HISTORY     Asthma        PAST SURGICAL HISTORY    No significant past surgical history reported        Allergies    No Known Allergies    Intolerances  None         MEDICATIONS  (STANDING):  cefTRIAXone   IVPB 1000 milliGRAM(s) IV Intermittent every 24 hours  enoxaparin Injectable 40 milliGRAM(s) SubCutaneous every 24 hours  lactated ringers. 1000 milliLiter(s) (90 mL/Hr) IV Continuous <Continuous>  metroNIDAZOLE    Tablet 500 milliGRAM(s) Oral every 8 hours  pantoprazole  Injectable 40 milliGRAM(s) IV Push daily  potassium phosphate / sodium phosphate Powder (PHOS-NaK) 1 Packet(s) Oral once    MEDICATIONS  (PRN):  acetaminophen     Tablet .. 650 milliGRAM(s) Oral every 6 hours PRN Temp greater or equal to 38C (100.4F), Mild Pain (1 - 3)  ondansetron Injectable 8 milliGRAM(s) IV Push every 8 hours PRN Nausea and/or Vomiting      SOCIAL HISTORY  Advanced Directives:       [ X ] Full Code       [  ] DNR  Marital Status:         [  ] M      [ X ] S      [  ] D       [  ] W  Children:       [  ] Yes      [ X ] No  Occupation:        [  ] Employed       [ X ] Unemployed       [  ] Retired  Diet:       [ X ] Regular       [  ] PEG feeding          [  ] NG tube feeding  Drug Use:           [  ] Patient denied          [X  ] Yes  Alcohol:           [ X ] No             [  ] Yes (socially)         [  ] Yes (chronic)  Tobacco:           [  ] Yes           [ X ] No      FAMILY HISTORY  [ X ] Heart Disease            [ X ] Diabetes             [ X ] HTN             [  ] Colon Cancer             [  ] Stomach Cancer              [  ] Pancreatic Cancer      VITAL SIGNS   Vital Signs Last 24 Hrs  T(C): 36.8 (09-04-24 @ 12:00), Max: 37.2 (09-03-24 @ 16:27)  T(F): 98.3 (09-04-24 @ 12:00), Max: 99 (09-03-24 @ 16:27)  HR: 86 (09-04-24 @ 12:00) (81 - 100)  BP: 127/83 (09-04-24 @ 12:00) (102/60 - 149/91)   RR: 18 (09-04-24 @ 12:00) (17 - 20)  SpO2: 98% (09-04-24 @ 12:00) (95% - 99%)           CBC Full  -  ( 04 Sep 2024 06:00 )  WBC Count : 11.08 K/uL  RBC Count : 4.26 M/uL  Hemoglobin : 13.7 g/dL  Hematocrit : 39.2 %  Platelet Count - Automated : 211 K/uL  Mean Cell Volume : 92.0 fl  Mean Cell Hemoglobin : 32.2 pg  Mean Cell Hemoglobin Concentration : 34.9 gm/dL  Auto Neutrophil # : 9.72 K/uL  Auto Lymphocyte # : 0.88 K/uL  Auto Monocyte # : 0.40 K/uL  Auto Eosinophil # : 0.00 K/uL  Auto Basophil # : 0.03 K/uL  Auto Neutrophil % : 87.7 %  Auto Lymphocyte % : 7.9 %  Auto Monocyte % : 3.6 %  Auto Eosinophil % : 0.0 %  Auto Basophil % : 0.3 %      09-04    140  |  108  |  10  ----------------------------<  98  3.3<L>   |  26  |  0.67    Ca    8.8      04 Sep 2024 06:00  Phos  1.8     09-04  Mg     2.4     09-04    TPro  7.8  /  Alb  4.4  /  TBili  0.4  /  DBili  x   /  AST  13  /  ALT  31  /  AlkPhos  77  09-03    Lipase: 31 U/L (09-04 @ 06:00)       Urinalysis with Rflx Culture (09.03.24 @ 15:10)   Urine Appearance: Clear  Color: Yellow  Specific Gravity: 1.071  pH Urine: 7.0  Protein, Urine: Trace mg/dL  Glucose Qualitative, Urine: Negative mg/dL  Ketone - Urine: 15 mg/dL  Blood, Urine: Small  Bilirubin: Negative  Urobilinogen: 0.2 mg/dL  Leukocyte Esterase Concentration: Negative  Nitrite: Negative      RADIOLOGY/IMAGING                  ACC: [id number] EXAM:  CT ABDOMEN AND PELVIS IC   ORDERED BY: MORENO HARRISON     PROCEDURE DATE:  09/03/2024          INTERPRETATION:  CLINICAL INFORMATION: Abdominal pain    COMPARISON: None.    CONTRAST/COMPLICATIONS:  IV Contrast: Omnipaque 350  90 cc administered   10 cc discarded  Oral Contrast: NONE  Complications: None reported at time of study completion    PROCEDURE:  CT of the Abdomen and Pelvis was performed.  Sagittal and coronal reformats were performed.    FINDINGS:  LOWER CHEST: Basilar atelectasis.    LIVER: Within normal limits.  BILE DUCTS: Normal caliber.  GALLBLADDER: Within normal limits.  SPLEEN: Within normal limits.  PANCREAS: Within normal limits.  ADRENALS: Within normal limits.  KIDNEYS/URETERS: Subcentimeter right renal hypodensity, too small to   further characterize. No hydronephrosis.    BLADDER: Within normal limits.  REPRODUCTIVE ORGANS: Uterus and adnexa within normal limits.    BOWEL: Hiatal hernia. No bowel obstruction. Colonic diverticulosis.   Appendix is absent. Diffuse colonic wall thickening.  PERITONEUM/RETROPERITONEUM: Within normal limits.  VESSELS: Within normal limits.  LYMPH NODES: No lymphadenopathy.  ABDOMINAL WALL: Within normal limits.  BONES: Within normal limits.    IMPRESSION:  Colonic wall thickening, question underdistention or colitis.

## 2024-09-04 NOTE — CONSULT NOTE ADULT - ASSESSMENT
1. Pancolitis  2. Diarrhea    Suggestions:    1. Check stool for culture and PCR  2. IVF hydration  3. Monitor electrolytes  4. Continue antibiotics  5. Protonix 40mg daily  6. Avoid NSAID  7. DVT prophylaxis

## 2024-09-04 NOTE — PROGRESS NOTE ADULT - PROBLEM SELECTOR PLAN 1
p/w n/v diarrhea for one day   CT A/P Colonic wall thickening.  likely 2/2 to  Cannibis use vs colitis.   s/p 2L haldol, reglan and zofran  c/w zofran, PPI  c/w LR 90cc/hr   c/w CLD, advance as tolerated  started rocephin and flagyl today  f/u GI PCR, stool cultures, O&P  GI consulted, f/u recs  monitor sx

## 2024-09-05 ENCOUNTER — TRANSCRIPTION ENCOUNTER (OUTPATIENT)
Age: 39
End: 2024-09-05

## 2024-09-05 VITALS
DIASTOLIC BLOOD PRESSURE: 71 MMHG | RESPIRATION RATE: 17 BRPM | HEART RATE: 74 BPM | OXYGEN SATURATION: 96 % | TEMPERATURE: 98 F | SYSTOLIC BLOOD PRESSURE: 115 MMHG

## 2024-09-05 LAB
ANION GAP SERPL CALC-SCNC: 6 MMOL/L — SIGNIFICANT CHANGE UP (ref 5–17)
BUN SERPL-MCNC: 9 MG/DL — SIGNIFICANT CHANGE UP (ref 7–18)
CALCIUM SERPL-MCNC: 8.9 MG/DL — SIGNIFICANT CHANGE UP (ref 8.4–10.5)
CHLORIDE SERPL-SCNC: 106 MMOL/L — SIGNIFICANT CHANGE UP (ref 96–108)
CO2 SERPL-SCNC: 29 MMOL/L — SIGNIFICANT CHANGE UP (ref 22–31)
CREAT SERPL-MCNC: 0.62 MG/DL — SIGNIFICANT CHANGE UP (ref 0.5–1.3)
EGFR: 118 ML/MIN/1.73M2 — SIGNIFICANT CHANGE UP
GLUCOSE SERPL-MCNC: 81 MG/DL — SIGNIFICANT CHANGE UP (ref 70–99)
HCT VFR BLD CALC: 38.3 % — SIGNIFICANT CHANGE UP (ref 34.5–45)
HGB BLD-MCNC: 13.5 G/DL — SIGNIFICANT CHANGE UP (ref 11.5–15.5)
MAGNESIUM SERPL-MCNC: 2.4 MG/DL — SIGNIFICANT CHANGE UP (ref 1.6–2.6)
MCHC RBC-ENTMCNC: 32.2 PG — SIGNIFICANT CHANGE UP (ref 27–34)
MCHC RBC-ENTMCNC: 35.2 GM/DL — SIGNIFICANT CHANGE UP (ref 32–36)
MCV RBC AUTO: 91.4 FL — SIGNIFICANT CHANGE UP (ref 80–100)
NRBC # BLD: 0 /100 WBCS — SIGNIFICANT CHANGE UP (ref 0–0)
PHOSPHATE SERPL-MCNC: 2 MG/DL — LOW (ref 2.5–4.5)
PLATELET # BLD AUTO: 223 K/UL — SIGNIFICANT CHANGE UP (ref 150–400)
POTASSIUM SERPL-MCNC: 3.4 MMOL/L — LOW (ref 3.5–5.3)
POTASSIUM SERPL-SCNC: 3.4 MMOL/L — LOW (ref 3.5–5.3)
RBC # BLD: 4.19 M/UL — SIGNIFICANT CHANGE UP (ref 3.8–5.2)
RBC # FLD: 11.6 % — SIGNIFICANT CHANGE UP (ref 10.3–14.5)
SODIUM SERPL-SCNC: 141 MMOL/L — SIGNIFICANT CHANGE UP (ref 135–145)
WBC # BLD: 6.76 K/UL — SIGNIFICANT CHANGE UP (ref 3.8–10.5)
WBC # FLD AUTO: 6.76 K/UL — SIGNIFICANT CHANGE UP (ref 3.8–10.5)

## 2024-09-05 PROCEDURE — 72100 X-RAY EXAM L-S SPINE 2/3 VWS: CPT

## 2024-09-05 PROCEDURE — 84100 ASSAY OF PHOSPHORUS: CPT

## 2024-09-05 PROCEDURE — 85027 COMPLETE CBC AUTOMATED: CPT

## 2024-09-05 PROCEDURE — 80307 DRUG TEST PRSMV CHEM ANLYZR: CPT

## 2024-09-05 PROCEDURE — 96374 THER/PROPH/DIAG INJ IV PUSH: CPT | Mod: XU

## 2024-09-05 PROCEDURE — 80053 COMPREHEN METABOLIC PANEL: CPT

## 2024-09-05 PROCEDURE — 83690 ASSAY OF LIPASE: CPT

## 2024-09-05 PROCEDURE — 85025 COMPLETE CBC W/AUTO DIFF WBC: CPT

## 2024-09-05 PROCEDURE — 83735 ASSAY OF MAGNESIUM: CPT

## 2024-09-05 PROCEDURE — 80048 BASIC METABOLIC PNL TOTAL CA: CPT

## 2024-09-05 PROCEDURE — 36415 COLL VENOUS BLD VENIPUNCTURE: CPT

## 2024-09-05 PROCEDURE — 84702 CHORIONIC GONADOTROPIN TEST: CPT

## 2024-09-05 PROCEDURE — 93005 ELECTROCARDIOGRAM TRACING: CPT

## 2024-09-05 PROCEDURE — 96372 THER/PROPH/DIAG INJ SC/IM: CPT | Mod: XU

## 2024-09-05 PROCEDURE — 87636 SARSCOV2 & INF A&B AMP PRB: CPT

## 2024-09-05 PROCEDURE — 96375 TX/PRO/DX INJ NEW DRUG ADDON: CPT | Mod: XU

## 2024-09-05 PROCEDURE — 84484 ASSAY OF TROPONIN QUANT: CPT

## 2024-09-05 PROCEDURE — 99285 EMERGENCY DEPT VISIT HI MDM: CPT | Mod: 25

## 2024-09-05 PROCEDURE — 81001 URINALYSIS AUTO W/SCOPE: CPT

## 2024-09-05 PROCEDURE — 70450 CT HEAD/BRAIN W/O DYE: CPT | Mod: MC

## 2024-09-05 PROCEDURE — 71045 X-RAY EXAM CHEST 1 VIEW: CPT

## 2024-09-05 PROCEDURE — 74177 CT ABD & PELVIS W/CONTRAST: CPT | Mod: MC

## 2024-09-05 RX ORDER — SODIUM PHOSPHATE, DIBASIC, ANHYDROUS, POTASSIUM PHOSPHATE, MONOBASIC, AND SODIUM PHOSPHATE, MONOBASIC, MONOHYDRATE 852; 155; 130 MG/1; MG/1; MG/1
1 TABLET, COATED ORAL ONCE
Refills: 0 | Status: COMPLETED | OUTPATIENT
Start: 2024-09-05 | End: 2024-09-05

## 2024-09-05 RX ADMIN — Medication 90 MILLILITER(S): at 05:48

## 2024-09-05 RX ADMIN — ACETAMINOPHEN 650 MILLIGRAM(S): 325 TABLET ORAL at 03:54

## 2024-09-05 RX ADMIN — Medication 40 MILLIGRAM(S): at 11:36

## 2024-09-05 RX ADMIN — Medication 500 MILLIGRAM(S): at 05:46

## 2024-09-05 RX ADMIN — ACETAMINOPHEN 650 MILLIGRAM(S): 325 TABLET ORAL at 04:30

## 2024-09-05 RX ADMIN — Medication 500 MILLIGRAM(S): at 13:02

## 2024-09-05 RX ADMIN — SODIUM PHOSPHATE, DIBASIC, ANHYDROUS, POTASSIUM PHOSPHATE, MONOBASIC, AND SODIUM PHOSPHATE, MONOBASIC, MONOHYDRATE 1 PACKET(S): 852; 155; 130 TABLET, COATED ORAL at 08:45

## 2024-09-05 RX ADMIN — Medication 1 PATCH: at 13:03

## 2024-09-05 RX ADMIN — Medication 100 MILLIGRAM(S): at 13:01

## 2024-09-05 RX ADMIN — ENOXAPARIN SODIUM 40 MILLIGRAM(S): 100 INJECTION SUBCUTANEOUS at 05:47

## 2024-09-05 NOTE — DISCHARGE NOTE PROVIDER - NSDCCPCAREPLAN_GEN_ALL_CORE_FT
PRINCIPAL DISCHARGE DIAGNOSIS  Diagnosis: Acute colitis  Assessment and Plan of Treatment: You were admitted with intractable vomiting from your colitis. Your CT showed colitis. We treated you with antibiotics for 3 days with Ceftriaxone and you improved. Please continue with oral rehydration and see your PCP within 1 week of discharge.      SECONDARY DISCHARGE DIAGNOSES  Diagnosis: Hyperemesis  Assessment and Plan of Treatment: You were diagnosed with hyperemesis, in other words, severe vomiting. Please follow up with your PCP within 1 week.

## 2024-09-05 NOTE — PROGRESS NOTE ADULT - SUBJECTIVE AND OBJECTIVE BOX
HPI:  38 y/o female, AAOx3 with a history of Asthma regular use of THC presents for abdominal pain, vomiting and diarrhea.  Patient is lethargic on exam and dismissive History obtained from Mother by bedside. Mother reports patient  had multiple episodes of nonbloody yellow-colored emesis as well as multiple episodes of nonbloody diarrhea since yesterday. Associated periumbilical nondilating abdominal cramping. Unable to obtain any further information.       To note: ED reports the patient reported THC use however, on my exam she said no.  (03 Sep 2024 17:25)      Patient is a 39y old  Female who presents with a chief complaint of intractable vomiting (05 Sep 2024 10:04)      INTERVAL HPI/OVERNIGHT EVENTS:  T(C): 36.6 (09-05-24 @ 11:51), Max: 36.9 (09-04-24 @ 19:54)  HR: 74 (09-05-24 @ 11:51) (65 - 74)  BP: 115/71 (09-05-24 @ 11:51) (104/62 - 116/73)  RR: 17 (09-05-24 @ 11:51) (15 - 18)  SpO2: 96% (09-05-24 @ 11:51) (96% - 97%)  Wt(kg): --  I&O's Summary      REVIEW OF SYSTEMS: denies fever, chills, SOB, palpitations, chest pain, abdominal pain, nausea, vomitting, diarrhea, constipation, dizziness    MEDICATIONS  (STANDING):  cefTRIAXone   IVPB 1000 milliGRAM(s) IV Intermittent every 24 hours  enoxaparin Injectable 40 milliGRAM(s) SubCutaneous every 24 hours  lactated ringers. 1000 milliLiter(s) (90 mL/Hr) IV Continuous <Continuous>  lidocaine   4% Patch 1 Patch Transdermal every 24 hours  metroNIDAZOLE    Tablet 500 milliGRAM(s) Oral every 8 hours  pantoprazole  Injectable 40 milliGRAM(s) IV Push daily    MEDICATIONS  (PRN):  acetaminophen     Tablet .. 650 milliGRAM(s) Oral every 6 hours PRN Temp greater or equal to 38C (100.4F), Mild Pain (1 - 3)  ondansetron Injectable 8 milliGRAM(s) IV Push every 8 hours PRN Nausea and/or Vomiting      PHYSICAL EXAM:  GENERAL: NAD, well-groomed, well-developed  HEAD:  Atraumatic, Normocephalic  EYES: EOMI, PERRLA, conjunctiva and sclera clear  ENMT: No tonsillar erythema, exudates, or enlargement; Moist mucous membranes, Good dentition, No lesions  NECK: Supple, No JVD, Normal thyroid  NERVOUS SYSTEM:  Alert & Oriented X3, Good concentration; Motor Strength 5/5 B/L upper and lower extremities; DTRs 2+ intact and symmetric  CHEST/LUNG: Clear to percussion bilaterally; No rales, rhonchi, wheezing, or rubs  HEART: Regular rate and rhythm; No murmurs, rubs, or gallops  ABDOMEN: Soft, Nontender, Nondistended; Bowel sounds present  EXTREMITIES:  2+ Peripheral Pulses, No clubbing, cyanosis, or edema  LYMPH: No lymphadenopathy noted  SKIN: No rashes or lesions  LABS:                        13.5   6.76  )-----------( 223      ( 05 Sep 2024 05:55 )             38.3     09-05    141  |  106  |  9   ----------------------------<  81  3.4<L>   |  29  |  0.62    Ca    8.9      05 Sep 2024 05:55  Phos  2.0     09-05  Mg     2.4     09-05        Urinalysis Basic - ( 05 Sep 2024 05:55 )    Color: x / Appearance: x / SG: x / pH: x  Gluc: 81 mg/dL / Ketone: x  / Bili: x / Urobili: x   Blood: x / Protein: x / Nitrite: x   Leuk Esterase: x / RBC: x / WBC x   Sq Epi: x / Non Sq Epi: x / Bacteria: x      CAPILLARY BLOOD GLUCOSE            Urinalysis Basic - ( 05 Sep 2024 05:55 )    Color: x / Appearance: x / SG: x / pH: x  Gluc: 81 mg/dL / Ketone: x  / Bili: x / Urobili: x   Blood: x / Protein: x / Nitrite: x   Leuk Esterase: x / RBC: x / WBC x   Sq Epi: x / Non Sq Epi: x / Bacteria: x    
HPI:  40 y/o female, AAOx3 with a history of Asthma regular use of THC presents for abdominal pain, vomiting and diarrhea.  Patient is lethargic on exam and dismissive History obtained from Mother by bedside. Mother reports patient  had multiple episodes of nonbloody yellow-colored emesis as well as multiple episodes of nonbloody diarrhea since yesterday. Associated periumbilical nondilating abdominal cramping. Unable to obtain any further information.       To note: ED reports the patient reported THC use however, on my exam she said no.  (03 Sep 2024 17:25)      Patient is a 39y old  Female who presents with a chief complaint of intractable vomiting (04 Sep 2024 13:18)      INTERVAL HPI/OVERNIGHT EVENTS:  T(C): 36.8 (09-04-24 @ 12:00), Max: 37.2 (09-03-24 @ 16:27)  HR: 86 (09-04-24 @ 12:00) (81 - 100)  BP: 127/83 (09-04-24 @ 12:00) (102/60 - 149/91)  RR: 18 (09-04-24 @ 12:00) (18 - 20)  SpO2: 98% (09-04-24 @ 12:00) (95% - 98%)  Wt(kg): --  I&O's Summary      REVIEW OF SYSTEMS: denies fever, chills, SOB, palpitations, chest pain, abdominal pain, nausea, vomitting, diarrhea, constipation, dizziness    MEDICATIONS  (STANDING):  cefTRIAXone   IVPB 1000 milliGRAM(s) IV Intermittent every 24 hours  enoxaparin Injectable 40 milliGRAM(s) SubCutaneous every 24 hours  lactated ringers. 1000 milliLiter(s) (90 mL/Hr) IV Continuous <Continuous>  lidocaine   4% Patch 1 Patch Transdermal every 24 hours  metroNIDAZOLE    Tablet 500 milliGRAM(s) Oral every 8 hours  pantoprazole  Injectable 40 milliGRAM(s) IV Push daily    MEDICATIONS  (PRN):  acetaminophen     Tablet .. 650 milliGRAM(s) Oral every 6 hours PRN Temp greater or equal to 38C (100.4F), Mild Pain (1 - 3)  ondansetron Injectable 8 milliGRAM(s) IV Push every 8 hours PRN Nausea and/or Vomiting      PHYSICAL EXAM:  GENERAL: NAD, well-groomed, well-developed  HEAD:  Atraumatic, Normocephalic  EYES: EOMI, PERRLA, conjunctiva and sclera clear  ENMT: No tonsillar erythema, exudates, or enlargement; Moist mucous membranes, Good dentition, No lesions  NECK: Supple, No JVD, Normal thyroid  NERVOUS SYSTEM:  Alert & Oriented X3, Good concentration; Motor Strength 5/5 B/L upper and lower extremities; DTRs 2+ intact and symmetric  CHEST/LUNG: Clear to percussion bilaterally; No rales, rhonchi, wheezing, or rubs  HEART: Regular rate and rhythm; No murmurs, rubs, or gallops  ABDOMEN: Soft, Nontender, Nondistended; Bowel sounds present  EXTREMITIES:  2+ Peripheral Pulses, No clubbing, cyanosis, or edema  LYMPH: No lymphadenopathy noted  SKIN: No rashes or lesions  LABS:                        13.7   11.08 )-----------( 211      ( 04 Sep 2024 06:00 )             39.2     09-04    140  |  108  |  10  ----------------------------<  98  3.3<L>   |  26  |  0.67    Ca    8.8      04 Sep 2024 06:00  Phos  1.8     09-04  Mg     2.4     09-04    TPro  7.8  /  Alb  4.4  /  TBili  0.4  /  DBili  x   /  AST  13  /  ALT  31  /  AlkPhos  77  09-03      Urinalysis Basic - ( 04 Sep 2024 06:00 )    Color: x / Appearance: x / SG: x / pH: x  Gluc: 98 mg/dL / Ketone: x  / Bili: x / Urobili: x   Blood: x / Protein: x / Nitrite: x   Leuk Esterase: x / RBC: x / WBC x   Sq Epi: x / Non Sq Epi: x / Bacteria: x      CAPILLARY BLOOD GLUCOSE            Urinalysis Basic - ( 04 Sep 2024 06:00 )    Color: x / Appearance: x / SG: x / pH: x  Gluc: 98 mg/dL / Ketone: x  / Bili: x / Urobili: x   Blood: x / Protein: x / Nitrite: x   Leuk Esterase: x / RBC: x / WBC x   Sq Epi: x / Non Sq Epi: x / Bacteria: x    
PGY-1 Progress Note discussed with attending    PAGER #: [[phone]] TILL 5:00 PM  PLEASE CONTACT ON CALL TEAM:  - On Call Team (Please refer to Katt) FROM 5:00 PM - 8:30PM  - Nightfloat Team FROM 8:30 -7:30 AM    OVERNIGHT EVENTS:   - Overnight, pt states that she had a few episodes of NBNB vomiting and 1 episode of loose stool. Pt seen at bedside, NAD, but fatigued with generalized weakness and diffuse abdominal pain. She also report lower back pain that radiates upwards and is TTP. Denies fevers, chills, CP, SOB, dysuria, numbness/tingling in extremities.     REVIEW OF SYSTEMS:  CONSTITUTIONAL: (+) generalized weakness, fatigue. No fever, weight loss,  RESPIRATORY: No cough, wheezing, chills or hemoptysis; No shortness of breath  CARDIOVASCULAR: No chest pain, palpitations, dizziness, or leg swelling  GASTROINTESTINAL: (+) diffuse abdominal pain, nausea, vomiting, diarrhea.  No abdominal pain. No nausea, vomiting, or hematemesis; No diarrhea or constipation. No melena or hematochezia.  GENITOURINARY: No dysuria or hematuria, urinary frequency  NEUROLOGICAL: No headaches, memory loss, loss of strength, numbness, or tremors  SKIN: No itching, burning, rashes, or lesions   MSK: (+) lower back pain radiating upwards    MEDICATIONS  (STANDING):  cefTRIAXone   IVPB 1000 milliGRAM(s) IV Intermittent every 24 hours  enoxaparin Injectable 40 milliGRAM(s) SubCutaneous every 24 hours  lactated ringers. 1000 milliLiter(s) (90 mL/Hr) IV Continuous <Continuous>  metroNIDAZOLE    Tablet 500 milliGRAM(s) Oral every 8 hours  pantoprazole  Injectable 40 milliGRAM(s) IV Push daily  potassium phosphate / sodium phosphate Powder (PHOS-NaK) 1 Packet(s) Oral once    MEDICATIONS  (PRN):  acetaminophen     Tablet .. 650 milliGRAM(s) Oral every 6 hours PRN Temp greater or equal to 38C (100.4F), Mild Pain (1 - 3)  ondansetron Injectable 8 milliGRAM(s) IV Push every 8 hours PRN Nausea and/or Vomiting      Vital Signs Last 24 Hrs  T(C): 36.6 (04 Sep 2024 04:35), Max: 37.2 (03 Sep 2024 16:27)  T(F): 97.8 (04 Sep 2024 04:35), Max: 99 (03 Sep 2024 16:27)  HR: 81 (04 Sep 2024 04:35) (81 - 100)  BP: 149/91 (04 Sep 2024 04:35) (102/60 - 149/91)  BP(mean): --  RR: 18 (04 Sep 2024 04:35) (17 - 20)  SpO2: 97% (04 Sep 2024 04:35) (95% - 99%)    Parameters below as of 04 Sep 2024 04:35  Patient On (Oxygen Delivery Method): room air        PHYSICAL EXAMINATION:  GENERAL: NAD, AAOx3, appears fatigued,   HEAD: AT/NC  EYES: conjunctiva and sclera clear  NECK: supple, No JVD noted, Normal thyroid  CHEST/LUNG: CTABL; no rales, rhonchi, wheezing, or rubs  HEART: regular rate and rhythm; no murmurs, rubs, or gallops  ABDOMEN: (+) hyperactive bowel sounds, diffusely tender to palpation. soft, , nondistended;  EXTREMITIES:  2+ Peripheral Pulses, No clubbing, cyanosis, or edema  SKIN: warm dry  MSK (+) lumbar spine TTP                          13.7   11.08 )-----------( 211      ( 04 Sep 2024 06:00 )             39.2     09-04    140  |  108  |  10  ----------------------------<  98  3.3<L>   |  26  |  0.67    Ca    8.8      04 Sep 2024 06:00  Phos  1.8     09-04  Mg     2.4     09-04    TPro  7.8  /  Alb  4.4  /  TBili  0.4  /  DBili  x   /  AST  13  /  ALT  31  /  AlkPhos  77  09-03    LIVER FUNCTIONS - ( 03 Sep 2024 10:30 )  Alb: 4.4 g/dL / Pro: 7.8 g/dL / ALK PHOS: 77 U/L / ALT: 31 U/L DA / AST: 13 U/L / GGT: x                     CAPILLARY BLOOD GLUCOSE          RADIOLOGY & ADDITIONAL TESTS:

## 2024-09-05 NOTE — DISCHARGE NOTE NURSING/CASE MANAGEMENT/SOCIAL WORK - NSDCPEFALRISK_GEN_ALL_CORE
For information on Fall & Injury Prevention, visit: [email or web address] OR  [email or web address] OR  https://www.cdc.gov/steadi/patient.html

## 2024-09-05 NOTE — PROGRESS NOTE ADULT - ASSESSMENT
seen and examiend  vsstable afebrile pn bed  denies any abd pain  no diarrhea  but had small amount of vomitus  has apetite      abd soft bs nml  mild uuper abd tend on deep palp    wbc 11   k 3.3  po4 1.8   ct abd colitis   ua small blood  trace protein   a/p gasteroenteritis   diarrhea resolved  once vomited in night   no abd pain but has mild tenderness  lft nmo  no snow sign  no renal angle tenderness   no s/p tenderness   advised plenty of water   on rocephine and flagyl     oob in chair stool cxs but no BM   no nsaid as pt used to take at home on and of  seen by GI today 
38 y/o female, AAOx3 with a history of Asthma, regular use of THC presents for abdominal pain, vomiting and diarrhea.  CT A/P Colonic wall thickening. Patient is begin admitted for intractable vomiting likely 2/2 to Cannibis use vs colitis.   
seen and examined vsstable  physical done  no cpor sob or palp    no abd pain  no diarrhea  no vomiting   lungs heart abd ok   labs noted    LS spine  negat  a/p diarrhea  resolved    dc home  out pt PCP

## 2024-09-05 NOTE — DISCHARGE NOTE NURSING/CASE MANAGEMENT/SOCIAL WORK - PATIENT PORTAL LINK FT
You can access the FollowMyHealth Patient Portal offered by [facility] by registering at the following website: [email or web address]. By joining [employer]’s FollowMyHealth portal, you will also be able to view your health information using other applications (apps) compatible with our system.

## 2024-09-05 NOTE — DISCHARGE NOTE PROVIDER - EXTENDED VTE YES NO FOR MLM ENOXAPARIN
, [General Appearance - Alert] : alert [General Appearance - In No Acute Distress] : in no acute distress [General Appearance - Well Nourished] : well nourished [General Appearance - Well Developed] : well developed [General Appearance - Well-Appearing] : healthy appearing [Sclera] : the sclera and conjunctiva were normal [PERRL With Normal Accommodation] : pupils were equal in size, round, and reactive to light [Extraocular Movements] : extraocular movements were intact [Outer Ear] : the ears and nose were normal in appearance [Examination Of The Oral Cavity] : the lips and gums were normal [Oropharynx] : the oropharynx was normal [Neck Appearance] : the appearance of the neck was normal [Neck Cervical Mass (___cm)] : no neck mass was observed [Jugular Venous Distention Increased] : there was no jugular-venous distention [Thyroid Diffuse Enlargement] : the thyroid was not enlarged [Thyroid Nodule] : there were no palpable thyroid nodules [Auscultation Breath Sounds / Voice Sounds] : lungs were clear to auscultation bilaterally [Heart Rate And Rhythm] : heart rate was normal and rhythm regular [Heart Sounds] : normal S1 and S2 [Heart Sounds Gallop] : no gallops [Murmurs] : no murmurs [Heart Sounds Pericardial Friction Rub] : no pericardial rub [Bowel Sounds] : normal bowel sounds [Abdomen Soft] : soft [] : no hepato-splenomegaly [Abdomen Mass (___ Cm)] : no abdominal mass palpated [Epigastric] : in the epigastric area [No CVA Tenderness] : no ~M costovertebral angle tenderness [Abnormal Walk] : normal gait [Musculoskeletal - Swelling] : no joint swelling seen [Skin Color & Pigmentation] : normal skin color and pigmentation [Skin Turgor] : normal skin turgor [Oriented To Time, Place, And Person] : oriented to person, place, and time [Periumbilical] : not in the periumbilical area [Suprapubic] : not in the suprapubic area [RUQ] : not in the in the right upper quadrant [RLQ] : not in the right lower quadrant [LUQ] : not in the left upper quadrant [LLQ] : not in the left lower quadrant [FreeTextEntry1] : deferred at this time

## 2024-09-05 NOTE — DISCHARGE NOTE PROVIDER - HOSPITAL COURSE
40 y/o female, AAOx3 with a history of Asthma, regular use of THC presents for abdominal pain, vomiting and diarrhea.  Vitals and labs were WNLs. s/p 2L haldol, reglan and zofran in ED. CT A/P Colonic wall thickening. Patient is begin admitted for intractable vomiting likely 2/2 to Cannibis use vs colitis. On day 2, pt was advanced to a full liquid diet as she had no more pain or vomiting. Patient is stable for discharge per attending and is advised to follow up with PCP as outpatient. Please refer to patient's complete medical chart with documents for a full hospital course, for this is only a brief summary.

## 2024-09-05 NOTE — DISCHARGE NOTE PROVIDER - CARE PROVIDER_API CALL
Bogdan Horvath  Internal Medicine  [address], [address]  [place] [address]  Phone: [phone]  Fax: [phone]  Follow Up Time:

## 2024-09-11 NOTE — CHART NOTE - NSCHARTNOTEFT_GEN_A_CORE
Patient was outreached 9/10 and 9/11 but did not answer nor could a voicemail be left. Called Alternate number on file and left a voicemail there.

## 2024-09-12 NOTE — CHART NOTE - NSCHARTNOTEFT_GEN_A_CORE
Transitional Care Management Services - Interactive Contact: Unable to be reached	  	  Patient was contacted for post-discharge counseling visit with pharmacist, but unable to be reached. Voicemail box was full and unable to leave voicemail.

## 2024-09-13 ENCOUNTER — APPOINTMENT (OUTPATIENT)
Dept: INTERNAL MEDICINE | Facility: CLINIC | Age: 39
End: 2024-09-13

## 2025-04-29 ENCOUNTER — INPATIENT (INPATIENT)
Facility: HOSPITAL | Age: 40
LOS: 2 days | Discharge: HOME | DRG: 392 | End: 2025-05-02
Attending: INTERNAL MEDICINE | Admitting: INTERNAL MEDICINE
Payer: MEDICAID

## 2025-04-29 VITALS
RESPIRATION RATE: 16 BRPM | OXYGEN SATURATION: 98 % | DIASTOLIC BLOOD PRESSURE: 90 MMHG | TEMPERATURE: 98 F | WEIGHT: 171.96 LBS | SYSTOLIC BLOOD PRESSURE: 150 MMHG | HEART RATE: 83 BPM | HEIGHT: 62 IN

## 2025-04-29 DIAGNOSIS — Z90.49 ACQUIRED ABSENCE OF OTHER SPECIFIED PARTS OF DIGESTIVE TRACT: Chronic | ICD-10-CM

## 2025-04-29 DIAGNOSIS — R11.2 NAUSEA WITH VOMITING, UNSPECIFIED: ICD-10-CM

## 2025-04-29 DIAGNOSIS — Z29.9 ENCOUNTER FOR PROPHYLACTIC MEASURES, UNSPECIFIED: ICD-10-CM

## 2025-04-29 LAB
ALBUMIN SERPL ELPH-MCNC: 4.9 G/DL — SIGNIFICANT CHANGE UP (ref 3.5–5)
ALP SERPL-CCNC: 82 U/L — SIGNIFICANT CHANGE UP (ref 40–120)
ALT FLD-CCNC: 28 U/L DA — SIGNIFICANT CHANGE UP (ref 10–60)
ANION GAP SERPL CALC-SCNC: 6 MMOL/L — SIGNIFICANT CHANGE UP (ref 5–17)
APPEARANCE UR: CLEAR — SIGNIFICANT CHANGE UP
AST SERPL-CCNC: 14 U/L — SIGNIFICANT CHANGE UP (ref 10–40)
BASOPHILS # BLD AUTO: 0.01 K/UL — SIGNIFICANT CHANGE UP (ref 0–0.2)
BASOPHILS NFR BLD AUTO: 0.1 % — SIGNIFICANT CHANGE UP (ref 0–2)
BILIRUB SERPL-MCNC: 0.8 MG/DL — SIGNIFICANT CHANGE UP (ref 0.2–1.2)
BILIRUB UR-MCNC: NEGATIVE — SIGNIFICANT CHANGE UP
BUN SERPL-MCNC: 14 MG/DL — SIGNIFICANT CHANGE UP (ref 7–18)
CALCIUM SERPL-MCNC: 10.1 MG/DL — SIGNIFICANT CHANGE UP (ref 8.4–10.5)
CHLORIDE SERPL-SCNC: 100 MMOL/L — SIGNIFICANT CHANGE UP (ref 96–108)
CO2 SERPL-SCNC: 29 MMOL/L — SIGNIFICANT CHANGE UP (ref 22–31)
COLOR SPEC: YELLOW — SIGNIFICANT CHANGE UP
CREAT SERPL-MCNC: 0.82 MG/DL — SIGNIFICANT CHANGE UP (ref 0.5–1.3)
DIFF PNL FLD: NEGATIVE — SIGNIFICANT CHANGE UP
EGFR: 94 ML/MIN/1.73M2 — SIGNIFICANT CHANGE UP
EGFR: 94 ML/MIN/1.73M2 — SIGNIFICANT CHANGE UP
EOSINOPHIL # BLD AUTO: 0.01 K/UL — SIGNIFICANT CHANGE UP (ref 0–0.5)
EOSINOPHIL NFR BLD AUTO: 0.1 % — SIGNIFICANT CHANGE UP (ref 0–6)
GLUCOSE SERPL-MCNC: 113 MG/DL — HIGH (ref 70–99)
GLUCOSE UR QL: NEGATIVE MG/DL — SIGNIFICANT CHANGE UP
HCG SERPL-ACNC: <1 MIU/ML — SIGNIFICANT CHANGE UP
HCT VFR BLD CALC: 45.8 % — HIGH (ref 34.5–45)
HGB BLD-MCNC: 16.3 G/DL — HIGH (ref 11.5–15.5)
IMM GRANULOCYTES NFR BLD AUTO: 0.3 % — SIGNIFICANT CHANGE UP (ref 0–0.9)
KETONES UR-MCNC: 80 MG/DL
LACTATE SERPL-SCNC: 1.1 MMOL/L — SIGNIFICANT CHANGE UP (ref 0.7–2)
LEUKOCYTE ESTERASE UR-ACNC: NEGATIVE — SIGNIFICANT CHANGE UP
LIDOCAIN IGE QN: 37 U/L — SIGNIFICANT CHANGE UP (ref 13–75)
LYMPHOCYTES # BLD AUTO: 0.96 K/UL — LOW (ref 1–3.3)
LYMPHOCYTES # BLD AUTO: 8.2 % — LOW (ref 13–44)
MAGNESIUM SERPL-MCNC: 2.3 MG/DL — SIGNIFICANT CHANGE UP (ref 1.6–2.6)
MCHC RBC-ENTMCNC: 32.3 PG — SIGNIFICANT CHANGE UP (ref 27–34)
MCHC RBC-ENTMCNC: 35.6 G/DL — SIGNIFICANT CHANGE UP (ref 32–36)
MCV RBC AUTO: 90.7 FL — SIGNIFICANT CHANGE UP (ref 80–100)
MONOCYTES # BLD AUTO: 0.79 K/UL — SIGNIFICANT CHANGE UP (ref 0–0.9)
MONOCYTES NFR BLD AUTO: 6.8 % — SIGNIFICANT CHANGE UP (ref 2–14)
NEUTROPHILS # BLD AUTO: 9.85 K/UL — HIGH (ref 1.8–7.4)
NEUTROPHILS NFR BLD AUTO: 84.5 % — HIGH (ref 43–77)
NITRITE UR-MCNC: NEGATIVE — SIGNIFICANT CHANGE UP
NRBC BLD AUTO-RTO: 0 /100 WBCS — SIGNIFICANT CHANGE UP (ref 0–0)
PH UR: 7 — SIGNIFICANT CHANGE UP (ref 5–8)
PHOSPHATE SERPL-MCNC: 2.2 MG/DL — LOW (ref 2.5–4.5)
PLATELET # BLD AUTO: 294 K/UL — SIGNIFICANT CHANGE UP (ref 150–400)
POTASSIUM SERPL-MCNC: 3.6 MMOL/L — SIGNIFICANT CHANGE UP (ref 3.5–5.3)
POTASSIUM SERPL-SCNC: 3.6 MMOL/L — SIGNIFICANT CHANGE UP (ref 3.5–5.3)
PROT SERPL-MCNC: 8.4 G/DL — HIGH (ref 6–8.3)
PROT UR-MCNC: NEGATIVE MG/DL — SIGNIFICANT CHANGE UP
RBC # BLD: 5.05 M/UL — SIGNIFICANT CHANGE UP (ref 3.8–5.2)
RBC # FLD: 11.7 % — SIGNIFICANT CHANGE UP (ref 10.3–14.5)
SODIUM SERPL-SCNC: 135 MMOL/L — SIGNIFICANT CHANGE UP (ref 135–145)
SP GR SPEC: 1.02 — SIGNIFICANT CHANGE UP (ref 1–1.03)
UROBILINOGEN FLD QL: 0.2 MG/DL — SIGNIFICANT CHANGE UP (ref 0.2–1)
WBC # BLD: 11.65 K/UL — HIGH (ref 3.8–10.5)
WBC # FLD AUTO: 11.65 K/UL — HIGH (ref 3.8–10.5)

## 2025-04-29 PROCEDURE — 71045 X-RAY EXAM CHEST 1 VIEW: CPT | Mod: 26

## 2025-04-29 PROCEDURE — 99285 EMERGENCY DEPT VISIT HI MDM: CPT | Mod: 25

## 2025-04-29 RX ORDER — SOD PHOS DI, MONO/K PHOS MONO 250 MG
1 TABLET ORAL ONCE
Refills: 0 | Status: COMPLETED | OUTPATIENT
Start: 2025-04-29 | End: 2025-04-29

## 2025-04-29 RX ORDER — MELATONIN 5 MG
3 TABLET ORAL AT BEDTIME
Refills: 0 | Status: DISCONTINUED | OUTPATIENT
Start: 2025-04-29 | End: 2025-05-02

## 2025-04-29 RX ORDER — ENOXAPARIN SODIUM 100 MG/ML
40 INJECTION SUBCUTANEOUS EVERY 24 HOURS
Refills: 0 | Status: DISCONTINUED | OUTPATIENT
Start: 2025-04-29 | End: 2025-05-02

## 2025-04-29 RX ORDER — DIPHENHYDRAMINE HCL 12.5MG/5ML
25 ELIXIR ORAL ONCE
Refills: 0 | Status: COMPLETED | OUTPATIENT
Start: 2025-04-29 | End: 2025-04-29

## 2025-04-29 RX ORDER — ALBUTEROL SULFATE 2.5 MG/3ML
2 VIAL, NEBULIZER (ML) INHALATION
Refills: 0 | DISCHARGE

## 2025-04-29 RX ORDER — ONDANSETRON HCL/PF 4 MG/2 ML
4 VIAL (ML) INJECTION ONCE
Refills: 0 | Status: COMPLETED | OUTPATIENT
Start: 2025-04-29 | End: 2025-04-29

## 2025-04-29 RX ORDER — ACETAMINOPHEN 500 MG/5ML
650 LIQUID (ML) ORAL EVERY 6 HOURS
Refills: 0 | Status: DISCONTINUED | OUTPATIENT
Start: 2025-04-29 | End: 2025-05-02

## 2025-04-29 RX ORDER — MAGNESIUM, ALUMINUM HYDROXIDE 200-200 MG
30 TABLET,CHEWABLE ORAL ONCE
Refills: 0 | Status: COMPLETED | OUTPATIENT
Start: 2025-04-29 | End: 2025-04-29

## 2025-04-29 RX ORDER — MAGNESIUM, ALUMINUM HYDROXIDE 200-200 MG
30 TABLET,CHEWABLE ORAL EVERY 4 HOURS
Refills: 0 | Status: DISCONTINUED | OUTPATIENT
Start: 2025-04-29 | End: 2025-05-02

## 2025-04-29 RX ORDER — ALBUTEROL SULFATE 2.5 MG/3ML
2 VIAL, NEBULIZER (ML) INHALATION EVERY 6 HOURS
Refills: 0 | Status: DISCONTINUED | OUTPATIENT
Start: 2025-04-29 | End: 2025-05-02

## 2025-04-29 RX ORDER — ONDANSETRON HCL/PF 4 MG/2 ML
4 VIAL (ML) INJECTION EVERY 8 HOURS
Refills: 0 | Status: DISCONTINUED | OUTPATIENT
Start: 2025-04-29 | End: 2025-04-30

## 2025-04-29 RX ORDER — METOCLOPRAMIDE HCL 10 MG
10 TABLET ORAL ONCE
Refills: 0 | Status: COMPLETED | OUTPATIENT
Start: 2025-04-29 | End: 2025-04-29

## 2025-04-29 RX ORDER — HALOPERIDOL 10 MG/1
5 TABLET ORAL ONCE
Refills: 0 | Status: COMPLETED | OUTPATIENT
Start: 2025-04-29 | End: 2025-04-29

## 2025-04-29 RX ADMIN — Medication 4 MILLIGRAM(S): at 10:26

## 2025-04-29 RX ADMIN — ENOXAPARIN SODIUM 40 MILLIGRAM(S): 100 INJECTION SUBCUTANEOUS at 21:27

## 2025-04-29 RX ADMIN — HALOPERIDOL 5 MILLIGRAM(S): 10 TABLET ORAL at 10:26

## 2025-04-29 RX ADMIN — Medication 20 MILLIGRAM(S): at 08:17

## 2025-04-29 RX ADMIN — Medication 25 MILLIGRAM(S): at 08:18

## 2025-04-29 RX ADMIN — Medication 30 MILLILITER(S): at 08:20

## 2025-04-29 RX ADMIN — Medication 1 PACKET(S): at 09:50

## 2025-04-29 RX ADMIN — Medication 3 MILLIGRAM(S): at 21:26

## 2025-04-29 RX ADMIN — Medication 40 MILLIGRAM(S): at 21:26

## 2025-04-29 RX ADMIN — Medication 100 MILLIEQUIVALENT(S): at 10:26

## 2025-04-29 RX ADMIN — Medication 1000 MILLILITER(S): at 08:17

## 2025-04-29 RX ADMIN — Medication 100 MILLIEQUIVALENT(S): at 11:31

## 2025-04-29 RX ADMIN — Medication 10 MILLIGRAM(S): at 08:19

## 2025-04-29 RX ADMIN — Medication 100 MILLILITER(S): at 18:19

## 2025-04-29 RX ADMIN — Medication 40 MILLIEQUIVALENT(S): at 09:50

## 2025-04-29 RX ADMIN — Medication 100 MILLIEQUIVALENT(S): at 13:16

## 2025-04-29 NOTE — H&P ADULT - NSHPPHYSICALEXAM_GEN_ALL_CORE
GENERAL: NAD; lying in bed, fatigued  HEAD:  Atraumatic, Normocephalic  EYES: EOMI, PERRLA, conjunctiva and sclera clear  ENMT: No tonsillar erythema, exudates, or enlargement; Dry mucous membranes  NECK: Supple, normal appearance, No JVD  NERVOUS SYSTEM:  Alert & Oriented X3,  Motor Strength 5/5 B/L upper and lower extremities, sensation intact  CHEST/LUNG: Lungs clear to auscultation bilaterally, No rales, rhonchi, wheezing   HEART: Regular rate and rhythm; No murmurs, rubs, or gallops  ABDOMEN: Soft, Nontender, Nondistended; Bowel sounds present  EXTREMITIES:  2+ Peripheral Pulses, No clubbing, cyanosis, or edema  SKIN: No rashes or lesions;    Vital Signs Last 24 Hrs  T(C): 36.7 (29 Apr 2025 07:43), Max: 36.7 (29 Apr 2025 07:43)  T(F): 98 (29 Apr 2025 07:43), Max: 98 (29 Apr 2025 07:43)  HR: 83 (29 Apr 2025 07:43) (83 - 83)  BP: 150/90 (29 Apr 2025 07:43) (150/90 - 150/90)  BP(mean): --  RR: 16 (29 Apr 2025 07:43) (16 - 16)  SpO2: 98% (29 Apr 2025 07:43) (98% - 98%)    Parameters below as of 29 Apr 2025 07:43  Patient On (Oxygen Delivery Method): room air

## 2025-04-29 NOTE — ED PROVIDER NOTE - PRINCIPAL DIAGNOSIS
Intractable nausea and vomiting This was a shared visit with the ESTELLE. I reviewed and verified the documentation and independently performed the documented:

## 2025-04-29 NOTE — ED ADULT NURSE NOTE - CHIEF COMPLAINT QUOTE
Reports had no appetite x over a month and then had gone out with friends and had Pizza on Friday ,since then been vomiting ,was seen in [place] ER ,no improvement ,hence came here

## 2025-04-29 NOTE — ED PROVIDER NOTE - PROGRESS NOTE DETAILS
Labs within normal limits.  Patient requiring multiple rounds of medications however still vomiting and unable to tolerate p.o.  Will admit to medicine for further management. Bianca Engle MD.

## 2025-04-29 NOTE — H&P ADULT - ATTENDING COMMENTS
vomiting  likely cannabis hyperemesis  zofran  supportive care  ivf  consider gi eval    Advanced care planning was discussed with patient and family.  Advanced care planning forms were reviewed and discussed as appropriate.  Differential diagnosis and plan of care discussed with patient after the evaluation.   Pain assessed and judicious use of narcotics when appropriate was discussed.  Importance of Fall prevention discussed.  Counseling on Smoking and Alcohol cessation was offered when appropriate.  Counseling on Diet, exercise, and medication compliance was done.

## 2025-04-29 NOTE — ED ADULT NURSE NOTE - OBJECTIVE STATEMENT
Reports had no appetite x over a month and then had gone out with friends and had Pizza on Friday ,since then been vomiting ,was seen in [place] ER ,no improvement ,hence came here. Patient denies sob, no chest pain. no blood in the urine, no blood in stool.

## 2025-04-29 NOTE — H&P ADULT - SOCIAL HISTORY: SUBSTANCE USE
Airway patent, Nasal mucosa clear. Mouth with normal mucosa. Reports smoking an eighth of cannabis daily

## 2025-04-29 NOTE — H&P ADULT - ASSESSMENT
Patient is a 40-year-old female with past medical history asthma, colitis, cannabis hyperemesis presenting with intractable NBNB vomiting since Sunday night (2 days). Smokes an eighth of marijuana daily. Patient being admitted for intractable vomiting likely 2/2 to cannabinoid hyperemesis.

## 2025-04-29 NOTE — ED ADULT NURSE NOTE - NSFALLUNIVINTERV_ED_ALL_ED
Bed/Stretcher in lowest position, wheels locked, appropriate side rails in place/Call bell, personal items and telephone in reach/Instruct patient to call for assistance before getting out of bed/chair/stretcher/Non-slip footwear applied when patient is off stretcher/[place] to call system/Physically safe environment - no spills, clutter or unnecessary equipment/Purposeful proactive rounding/Room/bathroom lighting operational, light cord in reach

## 2025-04-29 NOTE — ED PROVIDER NOTE - OBJECTIVE STATEMENT
Patient is a 40-year-old female with past medical history asthma, colitis, cannabis hyperemesis presenting with vomiting for 3 to 4 days.  Patient was admitted in September for intractable vomiting secondary to cannabis use versus colitis.  Patient states since Friday night she has had multiple episodes nonbilious nonbloody vomiting.  denies any abdominal pain, back pain, constipation, dysuria, hematuria, fever, cough, congestion, chest pain or shortness of breath.  Denies any other past medical history, allergies.

## 2025-04-29 NOTE — H&P ADULT - HISTORY OF PRESENT ILLNESS
Patient is a 40-year-old female with past medical history asthma, colitis, cannabis hyperemesis presenting with intractable vomiting since Sunday night (2 days). Patient reports she was attending a baby shower, had a bite of pizza, and began to feel nauseous and had her first bout of NBNB emesis. Patient reports her symptoms did not improve, and has lost count of how many times she vomitted, prompting ED visit. Denies any abdominal pain, although, she does say she feels a slight "burning sensation" in her midsternal area after all the vomiting. Of note,  patient was admitted in September for intractable vomiting secondary to cannabis use versus colitis. Patient says she has been unable to sleep due to the nausea and vomiting. Denies fevers, chills, diarrhea, or constipation. Patient admits to smoking "an eighth" of marijuana daily.

## 2025-04-29 NOTE — ED PROVIDER NOTE - CLINICAL SUMMARY MEDICAL DECISION MAKING FREE TEXT BOX
Patient is a 40-year-old female with past medical history asthma, colitis, cannabis hyperemesis presenting with vomiting for 3 to 4 days.  Patient was admitted in September for intractable vomiting secondary to cannabis use versus colitis.   vital signs stable, belly soft nontender no rebound no guarding no peritoneal signs.  – Will hydrate, antiemetics, check labs rule out electrolyte abnormalities and pancreatitis, lactate eval for end organ dysfunction, reassess.  Will defer abdominal CT at this time given patient denies any abdominal pain, benign physical exam, nontender and benign abdominal exam.

## 2025-04-29 NOTE — PATIENT PROFILE ADULT - FALL HARM RISK - RISK INTERVENTIONS

## 2025-04-29 NOTE — ED ADULT TRIAGE NOTE - HEIGHT IN CM
Onset: 7/7/21   Location / description: pt reports that he felt dizzy about 4 am this morning but not since.  Dizziness lasted for about 5 or 10 minutes.  No chest pain or SOB.  Denies feeling dizzy currently.    Precipitating Factors: unknown  Pain Scale (1-10), 10 highest: not rated   Associated Symptoms: as above  What  improves / worsens symptoms: none/ none  Symptom specific medications: none  Recent visits (last 3-4 weeks) for same reason or recent surgery:  No  Did the patient have a positive coronavirus screening?: not assessed  If yes, date of Covid-19 exposure:  n/a    PLAN:  See/Call Provider within 24 hours    Patient/Caller agrees to follow recommendations.    Triage RN attempted to warm transfer pt back to the office regarding 24 hour follow up but calls were already being diverted to after hours so, advised pt to call office again in AM.  Pt was agreeable.       (Pt has appt with PCP on 7/26/21).    Reason for Disposition  • Taking a medicine that could cause dizziness (e.g., blood pressure medications, diuretics)    Protocols used: DIZZINESS - [id number]       157.48

## 2025-04-29 NOTE — PATIENT PROFILE ADULT - NSTRANSFERBELONGINGSRESP_GEN_A_NUR
Bariatric phase 1 full liquid diet starting tomorrow for 2 weeks, then thin puree for 4 weeks.  No carbonated beverages. Avoid liquids with sugar and carbohydrates. Follow up with your dietician in 30 days.     yes

## 2025-04-29 NOTE — H&P ADULT - ATTENDING SUPERVISION STATEMENT
Primary OB/Gyne MD: Brittany Velasco M.D.     SUBJECTIVE:   Erin Price is a 25 year old female       here for annual well woman exam.  Had pelvic US earlier today due to irregular menses we discussed at prev visit.       She would really like to be pregnant.  Still no menses in about 5 months pt estimates.  Will do Provera challenge, day 3 labs and possible medication for ovulation induction.  Partner will get SA through [facility] as he does not have insurance.      No acne, no acanthosis, no central obesity.  Ovaries on US now and 2018 do NOT show typical polycystic appearance.  PCOS reviewed with patient.  Thinks that's waht the [facility] told her she had.      No LMP recorded. (Menstrual status: Other).    GYNE ROS:   Contraceptive method: none  Menses: irregular, sometimes skips a month, but not tracking or clear as to how often they really are.  Lasts 7 days, med flow, +cramps.    Vaginal symptoms: none.  Vulvar symptoms: none.  Discharge described as: normal and physiologic.  Other associated symptoms: no pain with sex or urination.    Hx STD: No    Pap smear: Negative for intraepithelial lesion or malignancy .    Mammogram: not indicated      OB History    Para Term  AB Living   0 0 0 0 0 0   SAB IAB Ectopic Molar Multiple Live Births   0 0 0 0 0 0       No current outpatient medications on file.     No current facility-administered medications for this visit.       ALLERGIES:  No Known Allergies    Past Medical History:   Diagnosis Date   • No pertinent past medical history        Past Surgical History:   Procedure Laterality Date   • No past surgeries         Family History   Problem Relation Age of Onset   • Patient is unaware of any medical problems Mother    • Diabetes Father    • Hyperlipidemia Father    • Cancer, Breast Neg Hx    • Cancer, Ovarian Neg Hx    • Cancer, Colon Neg Hx    • Cancer, Endometrial Neg Hx        Social History     Tobacco Use   • Smoking status: Never Smoker    • Smokeless tobacco: Never Used   Vaping Use   • Vaping Use: never used   Substance Use Topics   • Alcohol use: Never   • Drug use: Never         Review of Systems   Constitutional: Negative for appetite change and unexpected weight change.   HENT: Negative for drooling and trouble swallowing.    Eyes: Negative for discharge and redness.   Respiratory: Negative for shortness of breath and wheezing.    Cardiovascular: Negative for chest pain.   Gastrointestinal: Negative for blood in stool and constipation.   Endocrine: Negative for polyuria.   Genitourinary: Negative for dysuria, frequency, genital sores and hematuria.   Skin: Negative for rash.   Psychiatric/Behavioral: Negative for behavioral problems and self-injury.   All other systems reviewed and are negative.        DEPRESSION SCREEN:   Recent Review Flowsheet Data     Date 8/15/2022    Adult PHQ 2 Score 0    Adult PHQ 2 Interpretation No further screening needed    Little interest or pleasure in activity? Not at all    Feeling down, depressed or hopeless? Not at all          DEPRESSION ASSESSMENT/PLAN:  Depression screening is negative no further plan needed.        OBJECTIVE:  Visit Vitals  Pulse 76   Temp 97 °F (36.1 °C) (Temporal)   Resp 16   Ht 5' (1.524 m)   Wt 50.3 kg (111 lb)   BMI 21.68 kg/m²     Physical Exam   UPT: NEG.    General exam: Patient is a WDWN AF in NAD.  A/Ox3   Skin: no rashes or suspicious skin lesions noted.  Neck: supple, no adenopathy or masses noted in neck or supraclavicular regions.  Thyroid is not enlarged.   Heart/Lungs: Chest is clear. Heart is RRR w/out murmur.    Breast Exam: breasts symmetric, no dominant or suspicious mass, no skin or nipple changes and no axillary adenopathy bilaterally.  Abdomen: soft without masses, organomegaly or tenderness. No distension, rebound or guarding.  Pelvic Exam (speculum and bimanual):  External Genitalia: Normal appearance and hair distribution.  No lesions noted.  Urethral Meatus:  Normal size and location, no lesions or prolapse.  Urethra: No masses, tenderness or scarring  Bladder: No fullness, masses or tenderness  Vagina: Normal general appearance, no discharge or lesions.    Cervix: Normal appearance without lesions or discharge.  Uterus: Normal size, contour, position, mobility, and support.  No tenderness.  Adnexa/Parametria:  No masses, tenderness, organomegaly or nodularity noted.       ASSESSMENT:  Normal gyne exam.   Secondary Amenorrhea.          PLAN:  Pap smear was collected with brush/broom, prepared, and sent to lab for processing.    RX: Provera 10mg.  1 tab po qd x 10d. Call with day 1 of bleeding.   Day 3 labs ordered: TSH, FSH, LH, Fasting prolactin, Total testosterone.   DO before 8AM.    Explained when to do labs.  Partner to get SA on his own as well.     Reviewed/demonstrated monthly BSEs.  RTC for annual exam or prn.   Vladimir Quintanilla PA-C          Resident

## 2025-04-29 NOTE — H&P ADULT - PROBLEM SELECTOR PLAN 1
p/w intractible nausea and NBNB vomiting for 2 days  denies abdominal pain, diarrhea, constipation  vitals wnl, afebrile  labs wnl  UA negative, CXR clear  reports smoking eighth of marijuana daily  previously admitted for similar symptoms   s/p haldol, benadryl, famotidine, reglan, zofran, 2L NS bolus in ED    likely 2/2 to  Cannibis use   c/w zofran   c/w PPI  c/w iv fluids  c/w soft diet and advance as tolerated p/w intractible nausea and NBNB vomiting for 2 days  denies abdominal pain, diarrhea, constipation  vitals wnl, afebrile  labs wnl  EKG NSR, QTC 446ms  UA negative, CXR clear  reports smoking eighth of marijuana daily  previously admitted for similar symptoms   s/p haldol, benadryl, famotidine, reglan, zofran, 2L NS bolus in ED    likely 2/2 to  Cannibis use   c/w zofran   start PPI  c/w iv fluids  c/w soft diet and advance as tolerated

## 2025-04-29 NOTE — ED ADULT TRIAGE NOTE - CHIEF COMPLAINT QUOTE
Reports had no appetite x over a month and then had gone out with friends and had Pizza on Friday ,since then been vomiting ,was seen in [place] ER ,no improvement ,hence came here

## 2025-04-29 NOTE — H&P ADULT - NSCORESITESY/N_GEN_A_CORE_RD
Yes presenting after traumatic fall which was preceded by dizziness, no LOC or head trauma   h/o falls and syncopal episodes with injury in the setting of unsteady gait, as per daughter   CT thoracic/ Lumbar: acute lumbar compression fracture of L1 vertebrae with 25% loss in vertebral height   s/p Morphine 4mg x1 in the ED with good response   limit mobility for now with bedrest  Ortho consulted, awaiting further recs  Pain management consulted:   - Oxycodone 5 mg PO q 4 hours PRN severe pain. Monitor for sedation/ respiratory depression.   - Acetaminophen 1 gram PO q 8 hours x 3 days. Monitor LFTs  - Gabapentin 100mg po q 8 hours. Monitor renal function.   - c/w Lidoderm 4% patch daily  - start bowel regiment   - DCed Toradol due to CORTES  PT consulted presenting after traumatic fall which was preceded by dizziness, no LOC or head trauma   h/o falls and syncopal episodes with injury in the setting of unsteady gait, as per daughter   CT thoracic/ Lumbar: acute lumbar compression fracture of L1 vertebrae with 25% loss in vertebral height   s/p Morphine 4mg x1 in the ED with good response   limit mobility for now with bedrest  Ortho consulted, pending MRI Lumbar  Pain management consulted:   - Oxycodone 5 mg PO q 4 hours PRN severe pain. Monitor for sedation/ respiratory depression.   - Acetaminophen 1 gram PO q 8 hours x 3 days. Monitor LFTs  - Gabapentin 100mg po q 8 hours. Monitor renal function.   - c/w Lidoderm 4% patch daily  - start bowel regiment   - DCed Toradol due to CORTES  PT consulted: recommends home PT (family would prefer [facility])

## 2025-04-30 ENCOUNTER — TRANSCRIPTION ENCOUNTER (OUTPATIENT)
Age: 40
End: 2025-04-30

## 2025-04-30 DIAGNOSIS — E87.8 OTHER DISORDERS OF ELECTROLYTE AND FLUID BALANCE, NOT ELSEWHERE CLASSIFIED: ICD-10-CM

## 2025-04-30 DIAGNOSIS — R63.0 ANOREXIA: ICD-10-CM

## 2025-04-30 DIAGNOSIS — Z75.8 OTHER PROBLEMS RELATED TO MEDICAL FACILITIES AND OTHER HEALTH CARE: ICD-10-CM

## 2025-04-30 LAB
ALBUMIN SERPL ELPH-MCNC: 4.1 G/DL — SIGNIFICANT CHANGE UP (ref 3.5–5)
ALP SERPL-CCNC: 73 U/L — SIGNIFICANT CHANGE UP (ref 40–120)
ALT FLD-CCNC: 26 U/L DA — SIGNIFICANT CHANGE UP (ref 10–60)
ANION GAP SERPL CALC-SCNC: 9 MMOL/L — SIGNIFICANT CHANGE UP (ref 5–17)
AST SERPL-CCNC: 13 U/L — SIGNIFICANT CHANGE UP (ref 10–40)
BILIRUB SERPL-MCNC: 0.8 MG/DL — SIGNIFICANT CHANGE UP (ref 0.2–1.2)
BUN SERPL-MCNC: 4 MG/DL — LOW (ref 7–18)
CALCIUM SERPL-MCNC: 9 MG/DL — SIGNIFICANT CHANGE UP (ref 8.4–10.5)
CHLORIDE SERPL-SCNC: 100 MMOL/L — SIGNIFICANT CHANGE UP (ref 96–108)
CO2 SERPL-SCNC: 24 MMOL/L — SIGNIFICANT CHANGE UP (ref 22–31)
CREAT SERPL-MCNC: 0.58 MG/DL — SIGNIFICANT CHANGE UP (ref 0.5–1.3)
EGFR: 119 ML/MIN/1.73M2 — SIGNIFICANT CHANGE UP
EGFR: 119 ML/MIN/1.73M2 — SIGNIFICANT CHANGE UP
GLUCOSE SERPL-MCNC: 96 MG/DL — SIGNIFICANT CHANGE UP (ref 70–99)
HCT VFR BLD CALC: 42.3 % — SIGNIFICANT CHANGE UP (ref 34.5–45)
HGB BLD-MCNC: 15.2 G/DL — SIGNIFICANT CHANGE UP (ref 11.5–15.5)
MAGNESIUM SERPL-MCNC: 2.2 MG/DL — SIGNIFICANT CHANGE UP (ref 1.6–2.6)
MCHC RBC-ENTMCNC: 32.1 PG — SIGNIFICANT CHANGE UP (ref 27–34)
MCHC RBC-ENTMCNC: 35.9 G/DL — SIGNIFICANT CHANGE UP (ref 32–36)
MCV RBC AUTO: 89.2 FL — SIGNIFICANT CHANGE UP (ref 80–100)
NRBC BLD AUTO-RTO: 0 /100 WBCS — SIGNIFICANT CHANGE UP (ref 0–0)
PHOSPHATE SERPL-MCNC: 1.9 MG/DL — LOW (ref 2.5–4.5)
PLATELET # BLD AUTO: 259 K/UL — SIGNIFICANT CHANGE UP (ref 150–400)
POTASSIUM SERPL-MCNC: 3.4 MMOL/L — LOW (ref 3.5–5.3)
POTASSIUM SERPL-SCNC: 3.4 MMOL/L — LOW (ref 3.5–5.3)
PROT SERPL-MCNC: 7.2 G/DL — SIGNIFICANT CHANGE UP (ref 6–8.3)
RBC # BLD: 4.74 M/UL — SIGNIFICANT CHANGE UP (ref 3.8–5.2)
RBC # FLD: 11.4 % — SIGNIFICANT CHANGE UP (ref 10.3–14.5)
SODIUM SERPL-SCNC: 133 MMOL/L — LOW (ref 135–145)
WBC # BLD: 10.47 K/UL — SIGNIFICANT CHANGE UP (ref 3.8–10.5)
WBC # FLD AUTO: 10.47 K/UL — SIGNIFICANT CHANGE UP (ref 3.8–10.5)

## 2025-04-30 RX ORDER — POTASSIUM PHOSPHATE, MONOBASIC POTASSIUM PHOSPHATE, DIBASIC INJECTION, 236; 224 MG/ML; MG/ML
30 SOLUTION, CONCENTRATE INTRAVENOUS ONCE
Refills: 0 | Status: COMPLETED | OUTPATIENT
Start: 2025-04-30 | End: 2025-04-30

## 2025-04-30 RX ORDER — ONDANSETRON HCL/PF 4 MG/2 ML
4 VIAL (ML) INJECTION EVERY 8 HOURS
Refills: 0 | Status: DISCONTINUED | OUTPATIENT
Start: 2025-04-30 | End: 2025-05-02

## 2025-04-30 RX ADMIN — Medication 4 MILLIGRAM(S): at 05:05

## 2025-04-30 RX ADMIN — Medication 3 MILLIGRAM(S): at 05:10

## 2025-04-30 RX ADMIN — Medication 4 MILLIGRAM(S): at 21:38

## 2025-04-30 RX ADMIN — Medication 650 MILLIGRAM(S): at 14:20

## 2025-04-30 RX ADMIN — Medication 4 MILLIGRAM(S): at 16:45

## 2025-04-30 RX ADMIN — Medication 650 MILLIGRAM(S): at 13:22

## 2025-04-30 RX ADMIN — Medication 3 MILLIGRAM(S): at 23:03

## 2025-04-30 RX ADMIN — Medication 40 MILLIEQUIVALENT(S): at 11:26

## 2025-04-30 RX ADMIN — POTASSIUM PHOSPHATE, MONOBASIC POTASSIUM PHOSPHATE, DIBASIC INJECTION, 83.33 MILLIMOLE(S): 236; 224 SOLUTION, CONCENTRATE INTRAVENOUS at 19:05

## 2025-04-30 RX ADMIN — ENOXAPARIN SODIUM 40 MILLIGRAM(S): 100 INJECTION SUBCUTANEOUS at 21:38

## 2025-04-30 NOTE — PROGRESS NOTE ADULT - SUBJECTIVE AND OBJECTIVE BOX
NP Note discussed with  Primary Attending    Patient is a 40y old  Female who presents with a chief complaint of Intractable Vomiting (29 Apr 2025 14:08).  Persistent nausea and vomiting associated with anorexia.  Discussed with pt. risks associated with repeated episodes of hyperemesis due to cannabis use.  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  Encouraged to consider changing her job to which pt. seems apprehensive.       INTERVAL HPI/OVERNIGHT EVENTS: no new complaints    MEDICATIONS  (STANDING):  enoxaparin Injectable 40 milliGRAM(s) SubCutaneous every 24 hours  sodium chloride 0.9%. 1000 milliLiter(s) (100 mL/Hr) IV Continuous <Continuous>    MEDICATIONS  (PRN):  acetaminophen     Tablet .. 650 milliGRAM(s) Oral every 6 hours PRN Temp greater or equal to 38C (100.4F), Mild Pain (1 - 3)  albuterol    90 MICROgram(s) HFA Inhaler 2 Puff(s) Inhalation every 6 hours PRN for bronchospasm  aluminum hydroxide/magnesium hydroxide/simethicone Suspension 30 milliLiter(s) Oral every 4 hours PRN Dyspepsia  melatonin 3 milliGRAM(s) Oral at bedtime PRN Insomnia  ondansetron Injectable 4 milliGRAM(s) IV Push every 8 hours PRN Nausea and/or Vomiting      __________________________________________________  REVIEW OF SYSTEMS:    CONSTITUTIONAL: No fever,   EYES: no acute visual disturbances  NECK: No pain or stiffness  RESPIRATORY: No cough; No shortness of breath  CARDIOVASCULAR: No chest pain, no palpitations  GASTROINTESTINAL: No pain. No nausea or vomiting; No diarrhea   NEUROLOGICAL: No headache or numbness, no tremors  MUSCULOSKELETAL: No joint pain, no muscle pain  GENITOURINARY: no dysuria, no frequency, no hesitancy  PSYCHIATRY: no depression , no anxiety  ALL OTHER  ROS negative        Vital Signs Last 24 Hrs  T(C): 37.1 (30 Apr 2025 11:44), Max: 37.2 (29 Apr 2025 19:56)  T(F): 98.8 (30 Apr 2025 11:44), Max: 99 (29 Apr 2025 19:56)  HR: 69 (30 Apr 2025 11:44) (67 - 74)  BP: 147/89 (30 Apr 2025 11:44) (147/89 - 172/111)  BP(mean): 131 (30 Apr 2025 04:40) (131 - 131)  RR: 18 (30 Apr 2025 11:44) (17 - 18)  SpO2: 98% (30 Apr 2025 11:44) (97% - 98%)    Parameters below as of 30 Apr 2025 11:44  Patient On (Oxygen Delivery Method): room air        ________________________________________________  PHYSICAL EXAM:  GENERAL: NAD  HEENT: Normocephalic;  conjunctivae and sclerae clear; moist mucous membranes;   NECK : supple  CHEST/LUNG: Clear to auscultation bilaterally with good air entry   HEART: S1 S2  regular; no murmurs, gallops or rubs  ABDOMEN: Soft, Nontender, Nondistended; Bowel sounds present  EXTREMITIES: no cyanosis; no edema; no calf tenderness  SKIN: warm and dry; no rash  NERVOUS SYSTEM:  Awake and alert; Oriented  to place, person and time ; no new deficits    _________________________________________________  LABS:                        15.2   10.47 )-----------( 259      ( 30 Apr 2025 06:35 )             42.3     04-30    133[L]  |  100  |  4[L]  ----------------------------<  96  3.4[L]   |  24  |  0.58    Ca    9.0      30 Apr 2025 06:35  Phos  1.9     04-30  Mg     2.2     04-30    TPro  7.2  /  Alb  4.1  /  TBili  0.8  /  DBili  x   /  AST  13  /  ALT  26  /  AlkPhos  73  04-30      Urinalysis Basic - ( 30 Apr 2025 06:35 )    Color: x / Appearance: x / SG: x / pH: x  Gluc: 96 mg/dL / Ketone: x  / Bili: x / Urobili: x   Blood: x / Protein: x / Nitrite: x   Leuk Esterase: x / RBC: x / WBC x   Sq Epi: x / Non Sq Epi: x / Bacteria: x      CAPILLARY BLOOD GLUCOSE            RADIOLOGY & ADDITIONAL TESTS:    Imaging Personally Reviewed:  YES/NO    Consultant(s) Notes Reviewed:   YES/ No    Care Discussed with Consultants :     Plan of care was discussed with patient and /or primary care giver; all questions and concerns were addressed and care was aligned with patient's wishes.     NP Note discussed with  Primary Attending    Patient is a 40y old  Female who presents with a chief complaint of Intractable Vomiting (29 Apr 2025 14:08).  Persistent nausea and vomiting associated with anorexia.  Discussed with pt. risks associated with repeated episodes of hyperemesis due to cannabis use.  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  Encouraged to consider changing her job to which pt. seems apprehensive.       INTERVAL HPI/OVERNIGHT EVENTS: no new complaints    MEDICATIONS  (STANDING):  enoxaparin Injectable 40 milliGRAM(s) SubCutaneous every 24 hours  sodium chloride 0.9%. 1000 milliLiter(s) (100 mL/Hr) IV Continuous <Continuous>    MEDICATIONS  (PRN):  acetaminophen     Tablet .. 650 milliGRAM(s) Oral every 6 hours PRN Temp greater or equal to 38C (100.4F), Mild Pain (1 - 3)  albuterol    90 MICROgram(s) HFA Inhaler 2 Puff(s) Inhalation every 6 hours PRN for bronchospasm  aluminum hydroxide/magnesium hydroxide/simethicone Suspension 30 milliLiter(s) Oral every 4 hours PRN Dyspepsia  melatonin 3 milliGRAM(s) Oral at bedtime PRN Insomnia  ondansetron Injectable 4 milliGRAM(s) IV Push every 8 hours PRN Nausea and/or Vomiting      __________________________________________________  REVIEW OF SYSTEMS:    CONSTITUTIONAL: No fever,   EYES: no acute visual disturbances  NECK: No pain or stiffness  RESPIRATORY: No cough; No shortness of breath  CARDIOVASCULAR: No chest pain, no palpitations  GASTROINTESTINAL: No pain. +nausea, +vomiting; No diarrhea   NEUROLOGICAL: No headache or numbness, no tremors  MUSCULOSKELETAL: No joint pain, no muscle pain  GENITOURINARY: no dysuria, no frequency, no hesitancy  PSYCHIATRY: no depression , no anxiety  ALL OTHER  ROS negative        Vital Signs Last 24 Hrs  T(C): 37.1 (30 Apr 2025 11:44), Max: 37.2 (29 Apr 2025 19:56)  T(F): 98.8 (30 Apr 2025 11:44), Max: 99 (29 Apr 2025 19:56)  HR: 69 (30 Apr 2025 11:44) (67 - 74)  BP: 147/89 (30 Apr 2025 11:44) (147/89 - 172/111)  BP(mean): 131 (30 Apr 2025 04:40) (131 - 131)  RR: 18 (30 Apr 2025 11:44) (17 - 18)  SpO2: 98% (30 Apr 2025 11:44) (97% - 98%)    Parameters below as of 30 Apr 2025 11:44  Patient On (Oxygen Delivery Method): room air        ________________________________________________  PHYSICAL EXAM:  well developed  GENERAL: NAD  HEENT: Normocephalic;  conjunctivae and sclerae clear; moist mucous membranes;   NECK : supple  CHEST/LUNG: Clear to auscultation bilaterally with good air entry   HEART: S1 S2  regular; no murmurs, gallops or rubs  ABDOMEN: Soft, Nontender, Nondistended; Bowel sounds present  EXTREMITIES: no cyanosis; no edema; no calf tenderness  SKIN: warm and dry; no rash  NERVOUS SYSTEM:  Awake and alert; Oriented  to place, person and time ; no new deficits    _________________________________________________  LABS:                        15.2   10.47 )-----------( 259      ( 30 Apr 2025 06:35 )             42.3     04-30    133[L]  |  100  |  4[L]  ----------------------------<  96  3.4[L]   |  24  |  0.58    Ca    9.0      30 Apr 2025 06:35  Phos  1.9     04-30  Mg     2.2     04-30    TPro  7.2  /  Alb  4.1  /  TBili  0.8  /  DBili  x   /  AST  13  /  ALT  26  /  AlkPhos  73  04-30      Urinalysis Basic - ( 30 Apr 2025 06:35 )    Color: x / Appearance: x / SG: x / pH: x  Gluc: 96 mg/dL / Ketone: x  / Bili: x / Urobili: x   Blood: x / Protein: x / Nitrite: x   Leuk Esterase: x / RBC: x / WBC x   Sq Epi: x / Non Sq Epi: x / Bacteria: x      CAPILLARY BLOOD GLUCOSE            RADIOLOGY & ADDITIONAL TESTS:    Imaging Personally Reviewed:  YES/NO    Consultant(s) Notes Reviewed:   YES/ No    Care Discussed with Consultants :     Plan of care was discussed with patient and /or primary care giver; all questions and concerns were addressed and care was aligned with patient's wishes.

## 2025-04-30 NOTE — CHART NOTE - NSCHARTNOTEFT_GEN_A_CORE
EVENT: /111 during episode of vomiting X 1    BRIEF HPI: Previously documented: 40-year-old female with past medical history asthma, colitis, cannabis hyperemesis presenting with intractable NBNB vomiting since Sunday night (2 days). Smokes an eighth of marijuana daily. Patient admitted for intractable vomiting likely 2/2 to cannabinoid hyperemesis.    Vital Signs Last 24 Hrs  T(C): 36.9 (30 Apr 2025 06:20), Max: 37.2 (29 Apr 2025 16:22)  T(F): 98.5 (30 Apr 2025 06:20), Max: 99 (29 Apr 2025 19:56)  HR: 67 (30 Apr 2025 06:20) (67 - 86)  BP: 161/91 (30 Apr 2025 06:20) (146/78 - 172/111)  BP(mean): 131 (30 Apr 2025 04:40) (131 - 131)  RR: 17 (30 Apr 2025 06:20) (16 - 18)  SpO2: 97% (30 Apr 2025 06:20) (97% - 98%)    Parameters below as of 30 Apr 2025 06:20  Patient On (Oxygen Delivery Method): room air    PLAN  Cont ondansetron Injectable 4 sherri GRAM(s) IV Push every 8 hours PRN Nausea and/or Vomiting    FOLLOW UP: Repeat BP EVENT: /111 during episode of vomiting X 1    BRIEF HPI: Previously documented: 40-year-old female with past medical history asthma, colitis, cannabis hyperemesis presenting with intractable NBNB vomiting since Sunday night (2 days). Smokes an eighth of marijuana daily. Patient admitted for intractable vomiting likely 2/2 to cannabinoid hyperemesis.    PLAN  Cont ondansetron Injectable 4 sherri GRAM(s) IV Push every 8 hours PRN Nausea and/or Vomiting    FOLLOW UP: Repeat BP

## 2025-04-30 NOTE — DISCHARGE NOTE PROVIDER - NSDCCPCAREPLAN_GEN_ALL_CORE_FT
PRINCIPAL DISCHARGE DIAGNOSIS  Diagnosis: Intractable nausea and vomiting  Assessment and Plan of Treatment: You were admitted for vomiting which was due to your excessive use of Marijuana.  Refrain from marijuana use is crucial to your overall health.  Speak with your doctor regarding methods to assist you with marijuana use cessation.      SECONDARY DISCHARGE DIAGNOSES  Diagnosis: Cannabinoid hyperemesis syndrome  Assessment and Plan of Treatment: See above.    Diagnosis: Electrolyte imbalance  Assessment and Plan of Treatment: Your electrolyte potassium was low due to your decrease oral intake because of your vomiting.  Take small frequent meals to help meet your total caloric needs.    Diagnosis: Asthma  Assessment and Plan of Treatment: Conitnue to use your asthma medication as prescribed and report any woresning symptoms of asthma such as shortness of breath, chest tightness. Make sure to follow up with your pulmonologist/primary care doctor as out patient.       PRINCIPAL DISCHARGE DIAGNOSIS  Diagnosis: Cannabinoid hyperemesis syndrome  Assessment and Plan of Treatment: You were admitted for vomiting which was due to your excessive use of Marijuana.  Refrain from marijuana use is crucial to your overall health.  Speak with your doctor regarding methods to assist you with marijuana use cessation.  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  Consider changing your occupation.  Consider outpatient substance abuse rehab to help you quit      SECONDARY DISCHARGE DIAGNOSES  Diagnosis: Asthma  Assessment and Plan of Treatment: Conitnue to use your asthma medication as prescribed and report any woresning symptoms of asthma such as shortness of breath, chest tightness. Make sure to follow up with your pulmonologist/primary care doctor as out patient.      Diagnosis: Anorexia  Assessment and Plan of Treatment: You reported recent decrease in appetite and oral intake.  Take small frequent healthy meals    Diagnosis: Electrolyte imbalance  Assessment and Plan of Treatment: Your electrolytes, potassium and  phosphate were low due to intractable vomiting and decreased appetite.  Your electrolytes were replaced.  Maintain healthy diet  Follow up with your PCP for continued electrolytes monitoring.     PRINCIPAL DISCHARGE DIAGNOSIS  Diagnosis: Cannabinoid hyperemesis syndrome  Assessment and Plan of Treatment: You were admitted for vomiting which was due to your excessive use of Marijuana.  Refrain from marijuana use is crucial to your overall health.  Speak with your doctor regarding methods to assist you with marijuana use cessation.  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  Consider changing your occupation.  Consider outpatient substance abuse rehab to help you quit      SECONDARY DISCHARGE DIAGNOSES  Diagnosis: Asthma  Assessment and Plan of Treatment: Conitnue to use your asthma medication as prescribed and report any woresning symptoms of asthma such as shortness of breath, chest tightness. Make sure to follow up with your pulmonologist/primary care doctor as out patient.      Diagnosis: Electrolyte imbalance  Assessment and Plan of Treatment: Your electrolytes, potassium and  phosphate were low due to intractable vomiting and decreased appetite.  Your electrolytes were replaced.  Maintain healthy diet  Follow up with your PCP for continued electrolytes monitoring.    Diagnosis: Anorexia  Assessment and Plan of Treatment: You reported recent decrease in appetite and oral intake.  Take small frequent healthy meals    Diagnosis: Polycythemia  Assessment and Plan of Treatment: Likely due to Cannabis use  Your blood count noted elevated, no known reason.  -Follow up with your PCP for continued blood counts monitoring  -If elevated counts persist we recommend hematology consult  -STOP USING CANNABIS    Diagnosis: Anxiety  Assessment and Plan of Treatment: You reported increased anxiety requesting "something for anxiety".  Telepsych was consulted and recommended anti anxiety medication.  -Please follow up with your outpatient psychiatrist  -Take anti anxiety medication hydroxyzine as needed  -Consider non pharmaceutical anti anxiety modalities such as yoga, music, exercise, reading    Diagnosis: HTN (hypertension)  Assessment and Plan of Treatment: Your blood pressure noted elevated persistently likely due to anxiety.  You were followed by cardiologist and recommended Lopressor 25 mg 2x/day.  ECHO of your heart was unremarkable.  PLEASE FOLLOW UP WITH YOUR PCP TO FIND OUT IF YOU NEED TO CONTINUE BLOOD PRESSURE MEDS.  YOUR HIGH BLOOD PRESSURE MAY BE DUE TO ANXIETY.     PRINCIPAL DISCHARGE DIAGNOSIS  Diagnosis: Cannabinoid hyperemesis syndrome  Assessment and Plan of Treatment: You were admitted for vomiting which was due to your excessive use of Marijuana.  Refrain from marijuana use is crucial to your overall health.  Speak with your doctor regarding methods to assist you with marijuana use cessation.  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  Consider changing your occupation.  Consider outpatient substance abuse rehab to help you quit      SECONDARY DISCHARGE DIAGNOSES  Diagnosis: Asthma  Assessment and Plan of Treatment: Conitnue to use your asthma medication as prescribed and report any woresning symptoms of asthma such as shortness of breath, chest tightness. Make sure to follow up with your pulmonologist/primary care doctor as out patient.      Diagnosis: Electrolyte imbalance  Assessment and Plan of Treatment: Your electrolytes, potassium and  phosphate were low due to intractable vomiting and decreased appetite.  Your electrolytes were replaced.  Maintain healthy diet  Follow up with your PCP for continued electrolytes monitoring.    Diagnosis: Anorexia  Assessment and Plan of Treatment: You reported recent decrease in appetite and oral intake.  Take small frequent healthy meals    Diagnosis: Polycythemia  Assessment and Plan of Treatment: Likely due to Cannabis use  Your blood count noted elevated, no known reason.  -Follow up with your PCP for continued blood counts monitoring  -If elevated counts persist we recommend hematology consult  -STOP USING CANNABIS    Diagnosis: Anxiety  Assessment and Plan of Treatment: You reported increased anxiety requesting "something for anxiety".  Telepsych was consulted.  You declined medication therapy however open to outpatient psychotherapy.    -Please follow up with your outpatient psychiatrist  -Consider non pharmaceutical anti anxiety modalities such as yoga, music, exercise, reading    Diagnosis: HTN (hypertension)  Assessment and Plan of Treatment: Your blood pressure noted elevated persistently likely due to anxiety.  You were followed by cardiologist and recommended Lopressor 25 mg 2x/day.  ECHO of your heart was unremarkable.  PLEASE FOLLOW UP WITH YOUR PCP TO FIND OUT IF YOU NEED TO CONTINUE BLOOD PRESSURE MEDS.  YOUR HIGH BLOOD PRESSURE MAY BE DUE TO ANXIETY.

## 2025-04-30 NOTE — DISCHARGE NOTE PROVIDER - NSDCMRMEDTOKEN_GEN_ALL_CORE_FT
Albuterol (Eqv-Proventil HFA) 90 mcg/inh inhalation aerosol: 2 puff(s) inhaled every 6 hours as needed for  bronchospasm   Albuterol (Eqv-Proventil HFA) 90 mcg/inh inhalation aerosol: 2 puff(s) inhaled every 6 hours as needed for  bronchospasm  pantoprazole 40 mg oral delayed release tablet: 1 tab(s) orally once a day (before a meal)   Albuterol (Eqv-Proventil HFA) 90 mcg/inh inhalation aerosol: 2 puff(s) inhaled every 6 hours as needed for  bronchospasm  ondansetron 4 mg oral tablet: 1 tab(s) orally every 6 hours as needed for  nausea  pantoprazole 40 mg oral delayed release tablet: 1 tab(s) orally once a day (before a meal)   Albuterol (Eqv-Proventil HFA) 90 mcg/inh inhalation aerosol: 2 puff(s) inhaled every 6 hours as needed for  bronchospasm  hydrOXYzine hydrochloride 50 mg oral tablet: 1 tab(s) orally every 6 hours as needed for Anxiety  metoprolol tartrate 25 mg oral tablet: 1 tab(s) orally 2 times a day FOLLOW UP WITH PCP FOR B/P MONITORING  ondansetron 4 mg oral tablet: 1 tab(s) orally every 6 hours as needed for  nausea  pantoprazole 40 mg oral delayed release tablet: 1 tab(s) orally once a day (before a meal)   Albuterol (Eqv-Proventil HFA) 90 mcg/inh inhalation aerosol: 2 puff(s) inhaled every 6 hours as needed for  bronchospasm  metoprolol tartrate 25 mg oral tablet: 1 tab(s) orally 2 times a day FOLLOW UP WITH PCP FOR B/P MONITORING  ondansetron 4 mg oral tablet: 1 tab(s) orally every 6 hours as needed for  nausea  pantoprazole 40 mg oral delayed release tablet: 1 tab(s) orally once a day (before a meal)

## 2025-04-30 NOTE — SBIRT NOTE ADULT - NSSBIRTDRGPOSREINDET_GEN_A_CORE
Valerie educated pt on the benefits of substance use program to assist with illicit drug use. Valerie discussed the harms and life threatening effects illicit drug use has on her health.

## 2025-04-30 NOTE — DISCHARGE NOTE PROVIDER - HOSPITAL COURSE
Patient is a 40-year-old female with past medical history asthma, colitis, cannabis hyperemesis presenting with intractable vomiting since Sunday night (2 days). Patient reports she was attending a baby shower, had a bite of pizza, and began to feel nauseous and had her first bout of NBNB emesis. Patient reports her symptoms did not improve, and has lost count of how many times she vomitted, prompting ED visit. Denies any abdominal pain, although, she does say she feels a slight "burning sensation" in her midsternal area after all the vomiting. Of note,  patient was admitted in September for intractable vomiting secondary to cannabis use versus colitis. Patient says she has been unable to sleep due to the nausea and vomiting. Denies fevers, chills, diarrhea, or constipation. Patient admits to smoking "an eighth" of marijuana daily.     In ED, his vitals wnl, afebrile and labs wnl.      Patient being admitted for intractable vomiting likely 2/2 to cannabinoid hyperemesis.  Pt was treated with s/p haldol, benadryl, famotidine, reglan, zofran, 2L NS bolus in ED.    Pt was continued on IV hydration with PPI.    Symptoms improved and pt tolerated PO intake.    INCOMPLETE:::::::       Patient is a 40-year-old female with past medical history asthma, colitis, cannabis hyperemesis presenting with intractable vomiting since Sunday night (2 days). Patient reports she was attending a baby shower, had a bite of pizza, and began to feel nauseous and had her first bout of NBNB emesis. Patient reports her symptoms did not improve, and has lost count of how many times she vomited, prompting ED visit.  Of note,  patient was admitted in September for intractable vomiting secondary to cannabis use versus colitis.       Patient admitted for intractable vomiting likely 2/2 to cannabinoid hyperemesis, treated with IVF hydration, anti emetics, diet adjustments per pt.'s tolerance.  Pt. counselled re risks associated with excessive cannabis use and frequent hyperemesis.  Pt. 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  Advised to consider changing her occupation and join substance use rehab.     Symptoms improved and pt tolerated PO intake.  Pt. is medically stable for discharge to home.           Patient is a 40-year-old female with past medical history asthma, colitis, cannabis hyperemesis presenting with intractable vomiting since Sunday night (2 days). Patient reports she was attending a baby shower, had a bite of pizza, and began to feel nauseous and had her first bout of NBNB emesis. Patient reports her symptoms did not improve, and has lost count of how many times she vomited, prompting ED visit.  Of note,  patient was admitted in September for intractable vomiting secondary to cannabis use versus colitis.       Patient admitted for intractable vomiting likely 2/2 to cannabinoid hyperemesis, treated with IVF hydration, anti emetics, diet adjustments per pt.'s tolerance.  Pt. counselled re risks associated with excessive cannabis use and frequent hyperemesis.  Pt. 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  Advised to consider changing her occupation and joining substance use rehab.     Hospital course c/b episodes of HTN.  Started on HCTZ 25mg initially, switched to Lopressor 25mg BID by card ISO hyponatremia and polycythemia.  Course further c/b increased anxiety associated with chest pressure.  Card and Psych consulted.  ECHO unremarkable, CTH showed....................  Psych recc anti anxiety regimen ISO substance use.    Pt. with polycythemia on 5/1 and 5/2, may need hematology eval as OP if counts remain elevated.    Symptoms improved and pt tolerated PO intake.  Pt. is medically stable for discharge to home.           Patient is a 40-year-old female with past medical history asthma, colitis, cannabis hyperemesis presenting with intractable vomiting since Sunday night (2 days). Patient reports she was attending a baby shower, had a bite of pizza, and began to feel nauseous and had her first bout of NBNB emesis. Patient reports her symptoms did not improve, and has lost count of how many times she vomited, prompting ED visit.  Of note,  patient was admitted in September for intractable vomiting secondary to cannabis use versus colitis.       Patient admitted for intractable vomiting likely 2/2 to cannabinoid hyperemesis, treated with IVF hydration, anti emetics, diet adjustments per pt.'s tolerance.  Pt. counselled re risks associated with excessive cannabis use and frequent hyperemesis.  Pt. 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  Advised to consider changing her occupation and joining substance use rehab.     Hospital course c/b episodes of HTN.  Started on HCTZ 25mg initially, switched to Lopressor 25mg BID by card ISO hyponatremia and polycythemia.  Course further c/b increased anxiety associated with chest pressure.  Card and Psych consulted.  ECHO unremarkable, CTH showed no abnormal intracranial enhancement.      Psych consulted, pt. does not want medications however open to therapy for generalized anxiety.  OP referrals for psychotherapy provided to pt. by psych.    Pt. with polycythemia on 5/1 and 5/2, may need hematology eval as OP if counts remain elevated.    Symptoms improved and pt tolerated PO intake.  Pt. is medically stable for discharge to home.

## 2025-05-01 DIAGNOSIS — I10 ESSENTIAL (PRIMARY) HYPERTENSION: ICD-10-CM

## 2025-05-01 LAB
ANION GAP SERPL CALC-SCNC: 8 MMOL/L — SIGNIFICANT CHANGE UP (ref 5–17)
BUN SERPL-MCNC: 6 MG/DL — LOW (ref 7–18)
CALCIUM SERPL-MCNC: 9.1 MG/DL — SIGNIFICANT CHANGE UP (ref 8.4–10.5)
CHLORIDE SERPL-SCNC: 99 MMOL/L — SIGNIFICANT CHANGE UP (ref 96–108)
CO2 SERPL-SCNC: 26 MMOL/L — SIGNIFICANT CHANGE UP (ref 22–31)
CREAT SERPL-MCNC: 0.75 MG/DL — SIGNIFICANT CHANGE UP (ref 0.5–1.3)
EGFR: 104 ML/MIN/1.73M2 — SIGNIFICANT CHANGE UP
EGFR: 104 ML/MIN/1.73M2 — SIGNIFICANT CHANGE UP
GLUCOSE SERPL-MCNC: 107 MG/DL — HIGH (ref 70–99)
HCT VFR BLD CALC: 44.1 % — SIGNIFICANT CHANGE UP (ref 34.5–45)
HGB BLD-MCNC: 15.9 G/DL — HIGH (ref 11.5–15.5)
MAGNESIUM SERPL-MCNC: 2.2 MG/DL — SIGNIFICANT CHANGE UP (ref 1.6–2.6)
MCHC RBC-ENTMCNC: 31.5 PG — SIGNIFICANT CHANGE UP (ref 27–34)
MCHC RBC-ENTMCNC: 36.1 G/DL — HIGH (ref 32–36)
MCV RBC AUTO: 87.5 FL — SIGNIFICANT CHANGE UP (ref 80–100)
NRBC BLD AUTO-RTO: 0 /100 WBCS — SIGNIFICANT CHANGE UP (ref 0–0)
PHOSPHATE SERPL-MCNC: 2.6 MG/DL — SIGNIFICANT CHANGE UP (ref 2.5–4.5)
PLATELET # BLD AUTO: 269 K/UL — SIGNIFICANT CHANGE UP (ref 150–400)
POTASSIUM SERPL-MCNC: 3.7 MMOL/L — SIGNIFICANT CHANGE UP (ref 3.5–5.3)
POTASSIUM SERPL-SCNC: 3.7 MMOL/L — SIGNIFICANT CHANGE UP (ref 3.5–5.3)
RBC # BLD: 5.04 M/UL — SIGNIFICANT CHANGE UP (ref 3.8–5.2)
RBC # FLD: 11.5 % — SIGNIFICANT CHANGE UP (ref 10.3–14.5)
SODIUM SERPL-SCNC: 133 MMOL/L — LOW (ref 135–145)
WBC # BLD: 7.78 K/UL — SIGNIFICANT CHANGE UP (ref 3.8–10.5)
WBC # FLD AUTO: 7.78 K/UL — SIGNIFICANT CHANGE UP (ref 3.8–10.5)

## 2025-05-01 RX ORDER — ONDANSETRON HCL/PF 4 MG/2 ML
1 VIAL (ML) INJECTION
Qty: 20 | Refills: 0
Start: 2025-05-01 | End: 2025-05-05

## 2025-05-01 RX ADMIN — ENOXAPARIN SODIUM 40 MILLIGRAM(S): 100 INJECTION SUBCUTANEOUS at 21:42

## 2025-05-01 RX ADMIN — Medication 4 MILLIGRAM(S): at 21:42

## 2025-05-01 RX ADMIN — Medication 100 MILLILITER(S): at 07:03

## 2025-05-01 RX ADMIN — Medication 4 MILLIGRAM(S): at 05:58

## 2025-05-01 RX ADMIN — Medication 40 MILLIGRAM(S): at 05:59

## 2025-05-01 RX ADMIN — Medication 4 MILLIGRAM(S): at 13:12

## 2025-05-01 RX ADMIN — Medication 650 MILLIGRAM(S): at 05:58

## 2025-05-01 NOTE — PROGRESS NOTE ADULT - SUBJECTIVE AND OBJECTIVE BOX
NP Note discussed with  Primary Attending    Patient is a 40y old  Female who presents with a chief complaint of Intractable Vomiting (30 Apr 2025 18:12). Tolerating regular diet with no further vomiting however with decreased appetite.        INTERVAL HPI/OVERNIGHT EVENTS: no new complaints    MEDICATIONS  (STANDING):  enoxaparin Injectable 40 milliGRAM(s) SubCutaneous every 24 hours  hydrochlorothiazide 25 milliGRAM(s) Oral daily  ondansetron Injectable 4 milliGRAM(s) IV Push every 8 hours  pantoprazole    Tablet 40 milliGRAM(s) Oral before breakfast    MEDICATIONS  (PRN):  acetaminophen     Tablet .. 650 milliGRAM(s) Oral every 6 hours PRN Temp greater or equal to 38C (100.4F), Mild Pain (1 - 3)  albuterol    90 MICROgram(s) HFA Inhaler 2 Puff(s) Inhalation every 6 hours PRN for bronchospasm  aluminum hydroxide/magnesium hydroxide/simethicone Suspension 30 milliLiter(s) Oral every 4 hours PRN Dyspepsia  melatonin 3 milliGRAM(s) Oral at bedtime PRN Insomnia      __________________________________________________  REVIEW OF SYSTEMS:    CONSTITUTIONAL: No fever,   EYES: no acute visual disturbances  NECK: No pain or stiffness  RESPIRATORY: No cough; No shortness of breath  CARDIOVASCULAR: No chest pain, no palpitations  GASTROINTESTINAL: No pain. No nausea or vomiting; No diarrhea   NEUROLOGICAL: No headache or numbness, no tremors  MUSCULOSKELETAL: No joint pain, no muscle pain  GENITOURINARY: no dysuria, no frequency, no hesitancy  PSYCHIATRY: no depression , no anxiety  ALL OTHER  ROS negative        Vital Signs Last 24 Hrs  T(C): 36.9 (01 May 2025 11:56), Max: 36.9 (30 Apr 2025 20:15)  T(F): 98.4 (01 May 2025 11:56), Max: 98.5 (30 Apr 2025 20:15)  HR: 83 (01 May 2025 16:57) (77 - 93)  BP: 148/109 (01 May 2025 16:57) (131/81 - 165/108)  BP(mean): --  RR: 18 (01 May 2025 11:56) (18 - 18)  SpO2: 98% (01 May 2025 11:56) (95% - 98%)    Parameters below as of 01 May 2025 11:56  Patient On (Oxygen Delivery Method): room air        ________________________________________________  PHYSICAL EXAM:  GENERAL: NAD  HEENT: Normocephalic;  conjunctivae and sclerae clear; moist mucous membranes;   NECK : supple  CHEST/LUNG: Clear to auscultation bilaterally with good air entry   HEART: S1 S2  regular; no murmurs, gallops or rubs  ABDOMEN: Soft, Nontender, Nondistended; Bowel sounds present  EXTREMITIES: no cyanosis; no edema; no calf tenderness  SKIN: warm and dry; no rash  NERVOUS SYSTEM:  Awake and alert; Oriented  to place, person and time ; no new deficits    _________________________________________________  LABS:                        15.9   7.78  )-----------( 269      ( 01 May 2025 05:40 )             44.1     05-01    133[L]  |  99  |  6[L]  ----------------------------<  107[H]  3.7   |  26  |  0.75    Ca    9.1      01 May 2025 05:40  Phos  2.6     05-01  Mg     2.2     05-01    TPro  7.2  /  Alb  4.1  /  TBili  0.8  /  DBili  x   /  AST  13  /  ALT  26  /  AlkPhos  73  04-30      Urinalysis Basic - ( 01 May 2025 05:40 )    Color: x / Appearance: x / SG: x / pH: x  Gluc: 107 mg/dL / Ketone: x  / Bili: x / Urobili: x   Blood: x / Protein: x / Nitrite: x   Leuk Esterase: x / RBC: x / WBC x   Sq Epi: x / Non Sq Epi: x / Bacteria: x      CAPILLARY BLOOD GLUCOSE    RADIOLOGY & ADDITIONAL TESTS:    < from: Xray Chest 1 View- PORTABLE-Urgent (Xray Chest 1 View- PORTABLE-Urgent .) (04.29.25 @ 08:59) >    ACC: [id number] EXAM:  XR CHEST PORTABLE URGENT 1V   ORDERED BY: AJAY MCKEON     PROCEDURE DATE:  04/29/2025          INTERPRETATION:  Frontal chest on April 29, 2025 at 8:57 AM. Patient has   nausea and vomiting.    Heart size normal.    Lungs are clear. No free intraperitoneal air.    Chest is similar to September 3, 2024.    IMPRESSION: Negative chest.    --- End of Report ---    < end of copied text >    Imaging Personally Reviewed:  YES/NO    Consultant(s) Notes Reviewed:   YES/ No    Care Discussed with Consultants :     Plan of care was discussed with patient and /or primary care giver; all questions and concerns were addressed and care was aligned with patient's wishes.

## 2025-05-02 ENCOUNTER — RESULT REVIEW (OUTPATIENT)
Age: 40
End: 2025-05-02

## 2025-05-02 ENCOUNTER — TRANSCRIPTION ENCOUNTER (OUTPATIENT)
Age: 40
End: 2025-05-02

## 2025-05-02 VITALS
RESPIRATION RATE: 18 BRPM | OXYGEN SATURATION: 99 % | DIASTOLIC BLOOD PRESSURE: 86 MMHG | TEMPERATURE: 99 F | HEART RATE: 100 BPM | SYSTOLIC BLOOD PRESSURE: 134 MMHG

## 2025-05-02 DIAGNOSIS — F12.10 CANNABIS ABUSE, UNCOMPLICATED: ICD-10-CM

## 2025-05-02 DIAGNOSIS — D75.1 SECONDARY POLYCYTHEMIA: ICD-10-CM

## 2025-05-02 LAB
ANION GAP SERPL CALC-SCNC: 10 MMOL/L — SIGNIFICANT CHANGE UP (ref 5–17)
ANION GAP SERPL CALC-SCNC: 11 MMOL/L — SIGNIFICANT CHANGE UP (ref 5–17)
BUN SERPL-MCNC: 10 MG/DL — SIGNIFICANT CHANGE UP (ref 7–18)
BUN SERPL-MCNC: 10 MG/DL — SIGNIFICANT CHANGE UP (ref 7–18)
CALCIUM SERPL-MCNC: 10.2 MG/DL — SIGNIFICANT CHANGE UP (ref 8.4–10.5)
CALCIUM SERPL-MCNC: 9.9 MG/DL — SIGNIFICANT CHANGE UP (ref 8.4–10.5)
CHLORIDE SERPL-SCNC: 96 MMOL/L — SIGNIFICANT CHANGE UP (ref 96–108)
CHLORIDE SERPL-SCNC: 97 MMOL/L — SIGNIFICANT CHANGE UP (ref 96–108)
CO2 SERPL-SCNC: 24 MMOL/L — SIGNIFICANT CHANGE UP (ref 22–31)
CO2 SERPL-SCNC: 25 MMOL/L — SIGNIFICANT CHANGE UP (ref 22–31)
CREAT SERPL-MCNC: 0.96 MG/DL — SIGNIFICANT CHANGE UP (ref 0.5–1.3)
CREAT SERPL-MCNC: 1.12 MG/DL — SIGNIFICANT CHANGE UP (ref 0.5–1.3)
EGFR: 65 ML/MIN/1.73M2 — SIGNIFICANT CHANGE UP
EGFR: 65 ML/MIN/1.73M2 — SIGNIFICANT CHANGE UP
EGFR: 78 ML/MIN/1.73M2 — SIGNIFICANT CHANGE UP
EGFR: 78 ML/MIN/1.73M2 — SIGNIFICANT CHANGE UP
GLUCOSE SERPL-MCNC: 106 MG/DL — HIGH (ref 70–99)
GLUCOSE SERPL-MCNC: 135 MG/DL — HIGH (ref 70–99)
HCT VFR BLD CALC: 50.4 % — HIGH (ref 34.5–45)
HCT VFR BLD CALC: 50.9 % — HIGH (ref 34.5–45)
HGB BLD-MCNC: 18.6 G/DL — HIGH (ref 11.5–15.5)
HGB BLD-MCNC: 18.6 G/DL — HIGH (ref 11.5–15.5)
MCHC RBC-ENTMCNC: 31.8 PG — SIGNIFICANT CHANGE UP (ref 27–34)
MCHC RBC-ENTMCNC: 31.9 PG — SIGNIFICANT CHANGE UP (ref 27–34)
MCHC RBC-ENTMCNC: 36.5 G/DL — HIGH (ref 32–36)
MCHC RBC-ENTMCNC: 36.9 G/DL — HIGH (ref 32–36)
MCV RBC AUTO: 86.3 FL — SIGNIFICANT CHANGE UP (ref 80–100)
MCV RBC AUTO: 87.3 FL — SIGNIFICANT CHANGE UP (ref 80–100)
NRBC BLD AUTO-RTO: 0 /100 WBCS — SIGNIFICANT CHANGE UP (ref 0–0)
NRBC BLD AUTO-RTO: 0 /100 WBCS — SIGNIFICANT CHANGE UP (ref 0–0)
PLATELET # BLD AUTO: 316 K/UL — SIGNIFICANT CHANGE UP (ref 150–400)
PLATELET # BLD AUTO: 332 K/UL — SIGNIFICANT CHANGE UP (ref 150–400)
POTASSIUM SERPL-MCNC: 3.5 MMOL/L — SIGNIFICANT CHANGE UP (ref 3.5–5.3)
POTASSIUM SERPL-MCNC: 3.5 MMOL/L — SIGNIFICANT CHANGE UP (ref 3.5–5.3)
POTASSIUM SERPL-SCNC: 3.5 MMOL/L — SIGNIFICANT CHANGE UP (ref 3.5–5.3)
POTASSIUM SERPL-SCNC: 3.5 MMOL/L — SIGNIFICANT CHANGE UP (ref 3.5–5.3)
RBC # BLD: 5.83 M/UL — HIGH (ref 3.8–5.2)
RBC # BLD: 5.84 M/UL — HIGH (ref 3.8–5.2)
RBC # FLD: 11.4 % — SIGNIFICANT CHANGE UP (ref 10.3–14.5)
RBC # FLD: 11.5 % — SIGNIFICANT CHANGE UP (ref 10.3–14.5)
SODIUM SERPL-SCNC: 131 MMOL/L — LOW (ref 135–145)
SODIUM SERPL-SCNC: 132 MMOL/L — LOW (ref 135–145)
TROPONIN I, HIGH SENSITIVITY RESULT: 7.2 NG/L — SIGNIFICANT CHANGE UP
TSH SERPL-MCNC: 0.96 UU/ML — SIGNIFICANT CHANGE UP (ref 0.34–4.82)
WBC # BLD: 10.63 K/UL — HIGH (ref 3.8–10.5)
WBC # BLD: 11.1 K/UL — HIGH (ref 3.8–10.5)
WBC # FLD AUTO: 10.63 K/UL — HIGH (ref 3.8–10.5)
WBC # FLD AUTO: 11.1 K/UL — HIGH (ref 3.8–10.5)

## 2025-05-02 PROCEDURE — 84702 CHORIONIC GONADOTROPIN TEST: CPT

## 2025-05-02 PROCEDURE — 93306 TTE W/DOPPLER COMPLETE: CPT

## 2025-05-02 PROCEDURE — 83735 ASSAY OF MAGNESIUM: CPT

## 2025-05-02 PROCEDURE — 99285 EMERGENCY DEPT VISIT HI MDM: CPT

## 2025-05-02 PROCEDURE — 80053 COMPREHEN METABOLIC PANEL: CPT

## 2025-05-02 PROCEDURE — 84443 ASSAY THYROID STIM HORMONE: CPT

## 2025-05-02 PROCEDURE — 70460 CT HEAD/BRAIN W/DYE: CPT | Mod: 26

## 2025-05-02 PROCEDURE — 84100 ASSAY OF PHOSPHORUS: CPT

## 2025-05-02 PROCEDURE — 81003 URINALYSIS AUTO W/O SCOPE: CPT

## 2025-05-02 PROCEDURE — 96375 TX/PRO/DX INJ NEW DRUG ADDON: CPT

## 2025-05-02 PROCEDURE — 70460 CT HEAD/BRAIN W/DYE: CPT | Mod: MC

## 2025-05-02 PROCEDURE — 96372 THER/PROPH/DIAG INJ SC/IM: CPT

## 2025-05-02 PROCEDURE — 93010 ELECTROCARDIOGRAM REPORT: CPT

## 2025-05-02 PROCEDURE — 80048 BASIC METABOLIC PNL TOTAL CA: CPT

## 2025-05-02 PROCEDURE — 85027 COMPLETE CBC AUTOMATED: CPT

## 2025-05-02 PROCEDURE — 83690 ASSAY OF LIPASE: CPT

## 2025-05-02 PROCEDURE — 93306 TTE W/DOPPLER COMPLETE: CPT | Mod: 26

## 2025-05-02 PROCEDURE — 96374 THER/PROPH/DIAG INJ IV PUSH: CPT

## 2025-05-02 PROCEDURE — 36415 COLL VENOUS BLD VENIPUNCTURE: CPT

## 2025-05-02 PROCEDURE — 93005 ELECTROCARDIOGRAM TRACING: CPT

## 2025-05-02 PROCEDURE — 90792 PSYCH DIAG EVAL W/MED SRVCS: CPT | Mod: 95

## 2025-05-02 PROCEDURE — 85025 COMPLETE CBC W/AUTO DIFF WBC: CPT

## 2025-05-02 PROCEDURE — 83605 ASSAY OF LACTIC ACID: CPT

## 2025-05-02 PROCEDURE — 96376 TX/PRO/DX INJ SAME DRUG ADON: CPT

## 2025-05-02 PROCEDURE — 71045 X-RAY EXAM CHEST 1 VIEW: CPT

## 2025-05-02 PROCEDURE — 84484 ASSAY OF TROPONIN QUANT: CPT

## 2025-05-02 RX ORDER — METOPROLOL SUCCINATE 50 MG/1
25 TABLET, EXTENDED RELEASE ORAL
Refills: 0 | Status: DISCONTINUED | OUTPATIENT
Start: 2025-05-02 | End: 2025-05-02

## 2025-05-02 RX ORDER — HYDROXYZINE HYDROCHLORIDE 25 MG/1
1 TABLET, FILM COATED ORAL
Qty: 40 | Refills: 0
Start: 2025-05-02 | End: 2025-05-11

## 2025-05-02 RX ORDER — ONDANSETRON HCL/PF 4 MG/2 ML
1 VIAL (ML) INJECTION
Qty: 20 | Refills: 0
Start: 2025-05-02 | End: 2025-05-06

## 2025-05-02 RX ORDER — HYDROXYZINE HYDROCHLORIDE 25 MG/1
50 TABLET, FILM COATED ORAL EVERY 6 HOURS
Refills: 0 | Status: DISCONTINUED | OUTPATIENT
Start: 2025-05-02 | End: 2025-05-02

## 2025-05-02 RX ORDER — METOPROLOL SUCCINATE 50 MG/1
1 TABLET, EXTENDED RELEASE ORAL
Qty: 28 | Refills: 0
Start: 2025-05-02 | End: 2025-05-15

## 2025-05-02 RX ADMIN — HYDROXYZINE HYDROCHLORIDE 50 MILLIGRAM(S): 25 TABLET, FILM COATED ORAL at 12:32

## 2025-05-02 RX ADMIN — Medication 650 MILLIGRAM(S): at 06:25

## 2025-05-02 RX ADMIN — Medication 4 MILLIGRAM(S): at 06:00

## 2025-05-02 RX ADMIN — Medication 40 MILLIGRAM(S): at 06:00

## 2025-05-02 NOTE — DIETITIAN INITIAL EVALUATION ADULT - NSFNSGIASSESSMENTFT_GEN_A_CORE
Patient c/o food not going down and irritation from some foods such as OJ, and said she did not want to eat regular food at lunch today.

## 2025-05-02 NOTE — PROGRESS NOTE ADULT - PROBLEM SELECTOR PLAN 9
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

## 2025-05-02 NOTE — PROGRESS NOTE ADULT - PROBLEM SELECTOR PLAN 4
reports recent decreased appetite even with no N/V  -Currently tolerating diet however with decreased appetite  -Nutrition consult
hx of asthma on home rescue inhaler    not in acute exacerbation  c/w albuterol prn
Reports increased anxiety   -Reported chest pressure associated with palpitations  -EKG, troponin WNL  -f/u ECHO  -Telepsych consulted, recc hydroxyzine 50mg PO q6hrs prn anxiety.  If not effective can also use seroquel 25mg PO q6hrs prn.  If severe anxiety  Lorazepam 1mg PO or IM q6hrs prn  -Hydroxyzine ordered for now, escalate as needed

## 2025-05-02 NOTE — DIETITIAN INITIAL EVALUATION ADULT - OTHER INFO
Patient was admitted due to intractable vomiting most likely due to Cannibis use.  She has asthma, htn, electrolyte imbalance, PSH, colitis.

## 2025-05-02 NOTE — PROGRESS NOTE ADULT - PROBLEM SELECTOR PLAN 7
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
hx of asthma on home rescue inhaler    not in acute exacerbation  c/w albuterol prn

## 2025-05-02 NOTE — BH CONSULTATION LIAISON ASSESSMENT NOTE - SUMMARY
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   Patient aa0x4, good historian. reports anxiety of a generalized type and panic attacks, associated with a number of stressors,  including not being able to eat due to vomiting; concern over inadequate explanation of her medical symptoms; and   feeling "like a failure" due to having to move back in with her mother when her landlord asked her to leave. she feels   anxious, a bit depressed at times. however she denies any suicidal ideation intent plan, or hopelessness or anhedonia.   denies manic or psychotic sxs. denies subst use other than cannabis.   discussed medications and pt not interested in starting ssri at this time but would be interested in referral for psychotherapy.  Patient is not suicidal or an acute danger.

## 2025-05-02 NOTE — DIETITIAN INITIAL EVALUATION ADULT - ORAL INTAKE PTA/DIET HISTORY
Patient is alert and oriented. She was seen at bedside and provided information for the assessment. She said that she lost weight and was 180 lbs 1 month ago and now is 171.9 lbs. She is on a regular diet at home. She said that she still feels nauseated and that the food is not going down. Requested clear liquids for lunch today. She sa

## 2025-05-02 NOTE — CONSULT NOTE ADULT - SUBJECTIVE AND OBJECTIVE BOX
Date of Service  05-02-25 @ 14:34    CHIEF COMPLAINT:Patient is a 40y old  Female who presents with a chief complaint of Intractable Vomiting.      HPI:  Patient is a 40-year-old female with past medical history asthma, colitis, cannabis hyperemesis presenting with intractable vomiting since Sunday night (2 days). Patient reports she was attending a baby shower, had a bite of pizza, and began to feel nauseous and had her first bout of NBNB emesis. Patient reports her symptoms did not improve, and has lost count of how many times she vomitted, prompting ED visit. Denies any abdominal pain, although, she does say she feels a slight "burning sensation" in her midsternal area after all the vomiting. Of note,  patient was admitted in September for intractable vomiting secondary to cannabis use versus colitis. Patient says she has been unable to sleep due to the nausea and vomiting. Denies fevers, chills, diarrhea, or constipation. Patient admits to smoking "an eighth" of marijuana daily. Patient found to have hypertension and polycythemia. She had reported headache and chest pain earlier today.        PAST MEDICAL & SURGICAL HISTORY:  Asthma      Colitis      Cannabinoid hyperemesis syndrome      History of appendectomy          MEDICATIONS  (STANDING):  enoxaparin Injectable 40 milliGRAM(s) SubCutaneous every 24 hours  ondansetron Injectable 4 milliGRAM(s) IV Push every 8 hours  pantoprazole    Tablet 40 milliGRAM(s) Oral before breakfast    MEDICATIONS  (PRN):  acetaminophen     Tablet .. 650 milliGRAM(s) Oral every 6 hours PRN Temp greater or equal to 38C (100.4F), Mild Pain (1 - 3)  albuterol    90 MICROgram(s) HFA Inhaler 2 Puff(s) Inhalation every 6 hours PRN for bronchospasm  aluminum hydroxide/magnesium hydroxide/simethicone Suspension 30 milliLiter(s) Oral every 4 hours PRN Dyspepsia  hydrOXYzine hydrochloride 50 milliGRAM(s) Oral every 6 hours PRN Anxiety  melatonin 3 milliGRAM(s) Oral at bedtime PRN Insomnia      FAMILY HISTORY:No hx of CAD,Mother-HTN      SOCIAL HISTORY:    [x ] Non-smoker    [ x] Alcohol-denies    Allergies    No Known Allergies    Intolerances    	    REVIEW OF SYSTEMS:  CONSTITUTIONAL: No fever, weight loss, or fatigue  EYES: No eye pain, visual disturbances, or discharge  ENT:  No difficulty hearing, tinnitus, vertigo; No sinus or throat pain  NECK: No pain or stiffness  RESPIRATORY: No cough, wheezing, chills or hemoptysis; No Shortness of Breath  CARDIOVASCULAR: No chest pain, palpitations, passing out, dizziness, or leg swelling  GASTROINTESTINAL: No abdominal or epigastric pain. No nausea, vomiting, or hematemesis; No diarrhea or constipation. No melena or hematochezia.  GENITOURINARY: No dysuria, frequency, hematuria, or incontinence  NEUROLOGICAL: No headaches, memory loss, loss of strength, numbness, or tremors  SKIN: No itching, burning, rashes, or lesions   LYMPH Nodes: No enlarged glands  ENDOCRINE: No heat or cold intolerance; No hair loss  MUSCULOSKELETAL: No joint pain or swelling; No muscle, back, or extremity pain  PSYCHIATRIC: No depression, anxiety, mood swings, or difficulty sleeping  HEME/LYMPH: No easy bruising, or bleeding gums  ALLERGY AND IMMUNOLOGIC: No hives or eczema	        PHYSICAL EXAM:  T(C): 37.2 (05-02-25 @ 12:25), Max: 37.2 (05-02-25 @ 12:25)  HR: 100 (05-02-25 @ 12:25) (83 - 118)  BP: 134/86 (05-02-25 @ 12:25) (102/68 - 165/108)  RR: 18 (05-02-25 @ 12:25) (17 - 19)  SpO2: 99% (05-02-25 @ 12:25) (98% - 100%)  Wt(kg): --  I&O's Summary      Appearance: Normal	  HEENT:   Normal oral mucosa, PERRL, EOMI	  Lymphatic: No lymphadenopathy  Cardiovascular: Normal S1 S2, No JVD, No murmurs, No edema  Respiratory: Lungs clear to auscultation	  Psychiatry: A & O x 3, Mood & affect appropriate  Gastrointestinal:  Soft, Non-tender, + BS	  Skin: No rashes, No ecchymoses, No cyanosis	  Neurologic: Non-focal  Extremities: Normal range of motion, No clubbing, cyanosis or edema  Vascular: Peripheral pulses palpable 2+ bilaterally    	    ECG:  nsr,nl axis	    LABS:	 	        Troponin I, High Sensitivity Result: 7.2 ng/L (05-02-25 @ 07:52)                          18.6   10.63 )-----------( 332      ( 02 May 2025 07:52 )             50.9     05-02    132[L]  |  97  |  10  ----------------------------<  135[H]  3.5   |  25  |  1.12    Ca    10.2      02 May 2025 07:52  Phos  2.6     05-01  Mg     2.2     05-01        TSH: Thyroid Stimulating Hormone, Serum: 0.96 uU/mL (05-02 @ 07:52)        Echo-nl fx,no sig valvular lresions.

## 2025-05-02 NOTE — PROGRESS NOTE ADULT - SUBJECTIVE AND OBJECTIVE BOX
NP Note discussed with  Primary Attending    Patient is a 40y old  Female who presents with a chief complaint of Intractable Vomiting (01 May 2025 18:05).  Seen at bedside, reporting anxiety and poor appetite.  Earlier reported chest pressure.      INTERVAL HPI/OVERNIGHT EVENTS: no new complaints    MEDICATIONS  (STANDING):  enoxaparin Injectable 40 milliGRAM(s) SubCutaneous every 24 hours  hydrochlorothiazide 25 milliGRAM(s) Oral daily  ondansetron Injectable 4 milliGRAM(s) IV Push every 8 hours  pantoprazole    Tablet 40 milliGRAM(s) Oral before breakfast    MEDICATIONS  (PRN):  acetaminophen     Tablet .. 650 milliGRAM(s) Oral every 6 hours PRN Temp greater or equal to 38C (100.4F), Mild Pain (1 - 3)  albuterol    90 MICROgram(s) HFA Inhaler 2 Puff(s) Inhalation every 6 hours PRN for bronchospasm  aluminum hydroxide/magnesium hydroxide/simethicone Suspension 30 milliLiter(s) Oral every 4 hours PRN Dyspepsia  melatonin 3 milliGRAM(s) Oral at bedtime PRN Insomnia      __________________________________________________  REVIEW OF SYSTEMS:    CONSTITUTIONAL: No fever,   EYES: no acute visual disturbances  NECK: No pain or stiffness  RESPIRATORY: No cough; No shortness of breath  CARDIOVASCULAR: No chest pain, no palpitations  GASTROINTESTINAL: No pain. No nausea or vomiting; No diarrhea   NEUROLOGICAL: No headache or numbness, no tremors  MUSCULOSKELETAL: No joint pain, no muscle pain  GENITOURINARY: no dysuria, no frequency, no hesitancy  PSYCHIATRY: no depression , no anxiety  ALL OTHER  ROS negative        Vital Signs Last 24 Hrs  T(C): 36.9 (02 May 2025 07:05), Max: 36.9 (02 May 2025 07:05)  T(F): 98.4 (02 May 2025 07:05), Max: 98.4 (02 May 2025 07:05)  HR: 108 (02 May 2025 08:17) (83 - 118)  BP: 141/83 (02 May 2025 07:05) (102/68 - 165/108)  BP(mean): --  RR: 19 (02 May 2025 07:05) (17 - 19)  SpO2: 100% (02 May 2025 07:05) (98% - 100%)    Parameters below as of 02 May 2025 07:05  Patient On (Oxygen Delivery Method): room air        ________________________________________________  PHYSICAL EXAM:  Well developed  GENERAL: NAD  HEENT: Normocephalic;  conjunctivae and sclerae clear; moist mucous membranes;   NECK : supple  CHEST/LUNG: Clear to auscultation bilaterally with good air entry   HEART: S1 S2  regular; no murmurs, gallops or rubs  ABDOMEN: Soft, Nontender, Nondistended; Bowel sounds present  EXTREMITIES: no cyanosis; no edema; no calf tenderness  SKIN: warm and dry; no rash  NERVOUS SYSTEM:  Awake and alert; Oriented  to place, person and time ; no new deficits    _________________________________________________  LABS:                        18.6   10.63 )-----------( 332      ( 02 May 2025 07:52 )             50.9     05-02    132[L]  |  97  |  10  ----------------------------<  135[H]  3.5   |  25  |  1.12    Ca    10.2      02 May 2025 07:52  Phos  2.6     05-01  Mg     2.2     05-01        Urinalysis Basic - ( 02 May 2025 07:52 )    Color: x / Appearance: x / SG: x / pH: x  Gluc: 135 mg/dL / Ketone: x  / Bili: x / Urobili: x   Blood: x / Protein: x / Nitrite: x   Leuk Esterase: x / RBC: x / WBC x   Sq Epi: x / Non Sq Epi: x / Bacteria: x      CAPILLARY BLOOD GLUCOSE      RADIOLOGY & ADDITIONAL TESTS:    < from: Xray Chest 1 View- PORTABLE-Urgent (Xray Chest 1 View- PORTABLE-Urgent .) (04.29.25 @ 08:59) >    ACC: [id number] EXAM:  XR CHEST PORTABLE URGENT 1V   ORDERED BY: AJAY MCKEON     PROCEDURE DATE:  04/29/2025          INTERPRETATION:  Frontal chest on April 29, 2025 at 8:57 AM. Patient has   nausea and vomiting.    Heart size normal.    Lungs are clear. No free intraperitoneal air.    Chest is similar to September 3, 2024.    IMPRESSION: Negative chest.    --- End of Report ---    < end of copied text >      Imaging Personally Reviewed:  YES/NO    Consultant(s) Notes Reviewed:   YES/ No    Care Discussed with Consultants :     Plan of care was discussed with patient and /or primary care giver; all questions and concerns were addressed and care was aligned with patient's wishes.

## 2025-05-02 NOTE — DIETITIAN INITIAL EVALUATION ADULT - ETIOLOGY
[id number] GI function evidenced by vomiting and C/O nausea related to cannabinoid hyperemesis syndrome.

## 2025-05-02 NOTE — DIETITIAN INITIAL EVALUATION ADULT - LITERATURE/VIDEOS GIVEN
Provided with a copy of the gastroesophageal reflux diet from the [facility] and discussed with patient and her mother.

## 2025-05-02 NOTE — BH CONSULTATION LIAISON ASSESSMENT NOTE - NSBHCHARTREVIEWVS_PSY_A_CORE FT
Vital Signs Last 24 Hrs  T(C): 37.2 (02 May 2025 12:25), Max: 37.2 (02 May 2025 12:25)  T(F): 98.9 (02 May 2025 12:25), Max: 98.9 (02 May 2025 12:25)  HR: 100 (02 May 2025 12:25) (83 - 118)  BP: 134/86 (02 May 2025 12:25) (102/68 - 156/94)  BP(mean): --  RR: 18 (02 May 2025 12:25) (17 - 19)  SpO2: 99% (02 May 2025 12:25) (98% - 100%)    Parameters below as of 02 May 2025 12:25  Patient On (Oxygen Delivery Method): room air

## 2025-05-02 NOTE — PROGRESS NOTE ADULT - PROBLEM SELECTOR PLAN 3
Pt. with no known Hx of HTN, not on medications  -Persistent elevated b/p noted with no associated symptoms  -HTN initially contributed to anxiety however noted also at rest  -Started HCTZ 25mg daily on 5/1 per attending recc  -f/u ECHO  -Card Dr. Walton  -f/u b/p  -f/u [facility]
reports recent decreased appetite even with no N/V  -Currently with persistent N/V and decreased oral intake  -Nutrition consult
K+ and Phos wnl now  -Remains mildly hyponatremic with Na 133  -f/u am bmp

## 2025-05-02 NOTE — BH CONSULTATION LIAISON ASSESSMENT NOTE - HPI (INCLUDE ILLNESS QUALITY, SEVERITY, DURATION, TIMING, CONTEXT, MODIFYING FACTORS, ASSOCIATED SIGNS AND SYMPTOMS)
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   Patient aa0x4, good historian. reports anxiety of a generalized type and panic attacks, associated with a number of stressors,  including not being able to eat due to vomiting; concern over inadequate explanation of her medical symptoms; and   feeling "like a failure" due to having to move back in with her mother when her landlord asked her to leave. she feels   anxious, a bit depressed at times. however she denies any suicidal ideation intent plan, or hopelessness or anhedonia.   denies manic or psychotic sxs. denies subst use other than cannabis.   discussed medications and pt not interested in starting ssri at this time but would be interested in referral for psychotherapy.

## 2025-05-02 NOTE — PROGRESS NOTE ADULT - PROBLEM SELECTOR PLAN 5
K+ and Phos wnl now  -Remains mildly hyponatremic with Na 133  -f/u am bmp
hx of asthma on home rescue inhaler    not in acute exacerbation  c/w albuterol prn
DVT ppx: lovenox

## 2025-05-02 NOTE — BH CONSULTATION LIAISON ASSESSMENT NOTE - CURRENT MEDICATION
MEDICATIONS  (STANDING):  enoxaparin Injectable 40 milliGRAM(s) SubCutaneous every 24 hours  metoprolol tartrate 25 milliGRAM(s) Oral two times a day  ondansetron Injectable 4 milliGRAM(s) IV Push every 8 hours  pantoprazole    Tablet 40 milliGRAM(s) Oral before breakfast    MEDICATIONS  (PRN):  acetaminophen     Tablet .. 650 milliGRAM(s) Oral every 6 hours PRN Temp greater or equal to 38C (100.4F), Mild Pain (1 - 3)  albuterol    90 MICROgram(s) HFA Inhaler 2 Puff(s) Inhalation every 6 hours PRN for bronchospasm  aluminum hydroxide/magnesium hydroxide/simethicone Suspension 30 milliLiter(s) Oral every 4 hours PRN Dyspepsia  hydrOXYzine hydrochloride 50 milliGRAM(s) Oral every 6 hours PRN Anxiety  melatonin 3 milliGRAM(s) Oral at bedtime PRN Insomnia

## 2025-05-02 NOTE — PROGRESS NOTE ADULT - PROBLEM SELECTOR PLAN 1
p/w intractable nausea and vomiting for 2 days  -No vomiting since last night  -Started regular diet and tolerated  -Zofran ATC x 24hrs  -Cont PPI  -Serum Electrolytes reviewed, supplements not required today
p/w intractible nausea and NBNB vomiting for 2 days  -Persisting N&V today (4/30)  -Diet changed back to full liquid  -Zofran ATC x 24hrs  -Cont IVF for now  -Cont PPI  -Monitor electrolytes and supplement  -SW consult
p/w intractable nausea and vomiting for 2 days  -Vomiting resolved  -Poor appetite at this time  -Cont PPI  -Serum Electrolytes reviewed, supplements not required today

## 2025-05-02 NOTE — DIETITIAN INITIAL EVALUATION ADULT - PERTINENT MEDS FT
MEDICATIONS  (STANDING):  enoxaparin Injectable 40 milliGRAM(s) SubCutaneous every 24 hours  hydrochlorothiazide 25 milliGRAM(s) Oral daily  ondansetron Injectable 4 milliGRAM(s) IV Push every 8 hours  pantoprazole    Tablet 40 milliGRAM(s) Oral before breakfast    MEDICATIONS  (PRN):  acetaminophen     Tablet .. 650 milliGRAM(s) Oral every 6 hours PRN Temp greater or equal to 38C (100.4F), Mild Pain (1 - 3)  albuterol    90 MICROgram(s) HFA Inhaler 2 Puff(s) Inhalation every 6 hours PRN for bronchospasm  aluminum hydroxide/magnesium hydroxide/simethicone Suspension 30 milliLiter(s) Oral every 4 hours PRN Dyspepsia  melatonin 3 milliGRAM(s) Oral at bedtime PRN Insomnia

## 2025-05-02 NOTE — PROGRESS NOTE ADULT - PROBLEM SELECTOR PLAN 6
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
DVT ppx: lovenox
reports recent decreased appetite even with no N/V  -Currently tolerating diet however with decreased appetite  -Nutrition consult

## 2025-05-02 NOTE — PROGRESS NOTE ADULT - ASSESSMENT
Patient is a 40-year-old female with past medical history asthma, colitis, cannabis hyperemesis presenting with intractable NBNB vomiting since Sunday night (2 days). Smokes an eighth of marijuana daily. Patient  admitted for intractable vomiting likely 2/2 to cannabinoid hyperemesis.  Vomiting gradually decreased with anti emetics, now subsided, tolerating regular diet though with decreased appetite.  Hospital course c/b persistent HTN not associated with HA, dizziness, palpitations, CP or other discomfort.  Discussed with attending, decision made to start HCTZ, f/u ECHO and consult cardiology.  Pt. and mother in agreement with plan.  Discharge to home on hold for now.    5/2-Endorsed increased anxiety.  Telepsych consulted, will see her tomorrow, anti anxiety meds recommended. 
Patient is a 40-year-old female with past medical history asthma, colitis, cannabis hyperemesis presenting with intractable NBNB vomiting since Sunday night (2 days). Smokes an eighth of marijuana daily. Patient  admitted for intractable vomiting likely 2/2 to cannabinoid hyperemesis.  Vomiting gradually decreased with anti emetics, now subsided, tolerating regular diet though with decreased appetite.  Hospital course c/b persistent HTN not associated with HA, dizziness, palpitations, CP or other discomfort.  Discussed with attending, decision made to start HCTZ, f/u ECHO and consult cardiology.  Pt. and mother in agreement with plan.  Discharge to home on hold for now.
Patient is a 40-year-old female with past medical history asthma, colitis, cannabis hyperemesis presenting with intractable NBNB vomiting since Sunday night (2 days). Smokes an eighth of marijuana daily. Patient being admitted for intractable vomiting likely 2/2 to cannabinoid hyperemesis.

## 2025-05-02 NOTE — PROGRESS NOTE ADULT - PROBLEM SELECTOR PLAN 2
due to intractable vomiting  -K+ 3.4, Phos 1.9, Na+133  -Supplemented with K-Phos 30mili, Potassium 40meq  -f/u am BMP, phos, Mg+
Pt. with no known Hx of HTN, not on medications  -Persistent elevated b/p noted with no associated symptoms  -HTN initially contributed to anxiety however noted also at rest  -Started HCTZ 25mg daily per attending recc  -f/u ECHO  -Card Dr. Walton  -f/u b/p
H/H trending up, Hgn 18.6 today  -May be contributing to HTN and/or chest pressure  -Consider hem consult  -Discussed with attending

## 2025-05-02 NOTE — CHART NOTE - NSCHARTNOTEFT_GEN_A_CORE
Received a call from RN stating pt. endorses chest pressure.  12 LEAD ecg NSR 85bpm.  Upon am Labs: CMP,CBC,TSH,, Serum Tox, UA/Urine Tox,EKG evaluation noted with polycythemia with H/H 18.6/50.4 (could explain chest pressure).  Repeat stat Labs: cbc, bmp, TSH, Troponin ordered.  Will discuss with card and attending. Received a call from RN stating pt. endorses chest pressure.  12 LEAD ecg NSR 85bpm.  Upon review of am Labs noted with polycythemia with H/H 18.6/50.4 (could explain chest pressure).  Repeat stat Labs: cbc, bmp, TSH, Troponin ordered.  Will discuss with card and attending. Received a call from RN stating pt. endorses chest pressure.  12 LEAD ecg NSR 85bpm.  Upon review of am Labs noted with polycythemia with H/H 18.6/50.4 (could explain chest pressure).  Repeat stat Labs: cbc, bmp, TSH, Troponin ordered.  Will discuss with card and attending.  07:50-Pt. seen at bedside, endorses increased anxiety, states "can I have something for anxiety?".  Pt. reports she has in the past been prescribed anti anxiety med which she never took, does not recall name.  Psych Dr. Landa consulted, responded hes out, telepsych to be contacted at [phone].  Service starts at 9am. Received a call from RN stating pt. endorses chest pressure.  12 LEAD ecg NSR 85bpm.  Upon review of am Labs noted with polycythemia with H/H 18.6/50.4 (could explain chest pressure).  Repeat stat Labs: cbc, bmp, TSH, Troponin ordered.  Will discuss with card and attending.  07:50-Pt. seen at bedside, endorses increased anxiety, states "can I have something for anxiety?".  Pt. reports she has in the past been prescribed anti anxiety med which she never took, does not recall name.  Psych Dr. Landa consulted, responded hes out, telepsych to be contacted at [phone].  Service starts at 9am.    Repeat CBC unchanged, persistent polycythemia.  Telepsych consulted, will f/u reccs.

## 2025-05-02 NOTE — DIETITIAN INITIAL EVALUATION ADULT - PERTINENT LABORATORY DATA
05-02    132[L]  |  97  |  10  ----------------------------<  135[H]  3.5   |  25  |  1.12    Ca    10.2      02 May 2025 07:52  Phos  2.6     05-01  Mg     2.2     05-01

## 2025-05-02 NOTE — CONSULT NOTE ADULT - ASSESSMENT
40-year-old female with past medical history asthma, colitis, cannabis hyperemesis presenting with intractable vomiting since Sunday night (2 days),new onset HTN and polycythemia.  1.HTN-d/c hctz, add lopressor 25mg bid.  2.Polycythemia-Heme/Onc eval.  3.Smoking cessation.  4.Hyponatremia-inc fluid intake.  5.F/U CT head.  6.GI and DVT prophylaxis.  .

## 2025-05-02 NOTE — PROGRESS NOTE ADULT - NS ATTEND AMEND GEN_ALL_CORE FT
vomiting  likely 2/2 cannabis  supportive care  [facility]  ivf    Advanced care planning was discussed with patient and family.  Advanced care planning forms were reviewed and discussed as appropriate.  Differential diagnosis and plan of care discussed with patient after the evaluation.   Pain assessed and judicious use of narcotics when appropriate was discussed.  Importance of Fall prevention discussed.  Counseling on Smoking and Alcohol cessation was offered when appropriate.  Counseling on Diet, exercise, and medication compliance was done.
vomiting improved - tolerating diet  htn - start hctz, check tte. cards consulted  pt reports h/o brain mass. will check CT head    Advanced care planning was discussed with patient and family.  Advanced care planning forms were reviewed and discussed as appropriate.  Differential diagnosis and plan of care discussed with patient after the evaluation.   Pain assessed and judicious use of narcotics when appropriate was discussed.  Importance of Fall prevention discussed.  Counseling on Smoking and Alcohol cessation was offered when appropriate.  Counseling on Diet, exercise, and medication compliance was done.
vomiting - improved. diet as tolerated. zofran prn  htn - bp improved. f/u cards eval  chest pain - f/u cards eval. f/u TTE  f/u CT head    Advanced care planning was discussed with patient and family.  Advanced care planning forms were reviewed and discussed as appropriate.  Differential diagnosis and plan of care discussed with patient after the evaluation.   Pain assessed and judicious use of narcotics when appropriate was discussed.  Importance of Fall prevention discussed.  Counseling on Smoking and Alcohol cessation was offered when appropriate.  Counseling on Diet, exercise, and medication compliance was done.

## 2025-05-04 ENCOUNTER — EMERGENCY (EMERGENCY)
Facility: HOSPITAL | Age: 40
LOS: 1 days | End: 2025-05-04
Attending: EMERGENCY MEDICINE
Payer: MEDICAID

## 2025-05-04 VITALS
RESPIRATION RATE: 20 BRPM | HEIGHT: 62 IN | OXYGEN SATURATION: 98 % | SYSTOLIC BLOOD PRESSURE: 128 MMHG | TEMPERATURE: 99 F | WEIGHT: 179.02 LBS | HEART RATE: 120 BPM | DIASTOLIC BLOOD PRESSURE: 95 MMHG

## 2025-05-04 VITALS
RESPIRATION RATE: 18 BRPM | DIASTOLIC BLOOD PRESSURE: 75 MMHG | HEART RATE: 86 BPM | OXYGEN SATURATION: 100 % | SYSTOLIC BLOOD PRESSURE: 128 MMHG

## 2025-05-04 DIAGNOSIS — Z90.49 ACQUIRED ABSENCE OF OTHER SPECIFIED PARTS OF DIGESTIVE TRACT: Chronic | ICD-10-CM

## 2025-05-04 PROBLEM — R11.2 NAUSEA WITH VOMITING, UNSPECIFIED: Chronic | Status: ACTIVE | Noted: 2025-04-29

## 2025-05-04 PROBLEM — K52.9 NONINFECTIVE GASTROENTERITIS AND COLITIS, UNSPECIFIED: Chronic | Status: ACTIVE | Noted: 2025-04-29

## 2025-05-04 LAB
ALBUMIN SERPL ELPH-MCNC: 4.8 G/DL — SIGNIFICANT CHANGE UP (ref 3.5–5)
ALP SERPL-CCNC: 82 U/L — SIGNIFICANT CHANGE UP (ref 40–120)
ALT FLD-CCNC: 45 U/L DA — SIGNIFICANT CHANGE UP (ref 10–60)
ANION GAP SERPL CALC-SCNC: 10 MMOL/L — SIGNIFICANT CHANGE UP (ref 5–17)
APPEARANCE UR: CLEAR — SIGNIFICANT CHANGE UP
APTT BLD: 26.5 SEC — SIGNIFICANT CHANGE UP (ref 26.1–36.8)
AST SERPL-CCNC: 19 U/L — SIGNIFICANT CHANGE UP (ref 10–40)
BACTERIA # UR AUTO: ABNORMAL /HPF
BASOPHILS # BLD AUTO: 0.03 K/UL — SIGNIFICANT CHANGE UP (ref 0–0.2)
BASOPHILS NFR BLD AUTO: 0.3 % — SIGNIFICANT CHANGE UP (ref 0–2)
BILIRUB SERPL-MCNC: 1.2 MG/DL — SIGNIFICANT CHANGE UP (ref 0.2–1.2)
BILIRUB UR-MCNC: ABNORMAL
BUN SERPL-MCNC: 19 MG/DL — HIGH (ref 7–18)
CALCIUM SERPL-MCNC: 9.9 MG/DL — SIGNIFICANT CHANGE UP (ref 8.4–10.5)
CHLORIDE SERPL-SCNC: 94 MMOL/L — LOW (ref 96–108)
CO2 SERPL-SCNC: 25 MMOL/L — SIGNIFICANT CHANGE UP (ref 22–31)
COLOR SPEC: SIGNIFICANT CHANGE UP
COMMENT - URINE: SIGNIFICANT CHANGE UP
CREAT SERPL-MCNC: 1.14 MG/DL — SIGNIFICANT CHANGE UP (ref 0.5–1.3)
DIFF PNL FLD: ABNORMAL
EGFR: 63 ML/MIN/1.73M2 — SIGNIFICANT CHANGE UP
EGFR: 63 ML/MIN/1.73M2 — SIGNIFICANT CHANGE UP
EOSINOPHIL # BLD AUTO: 0.01 K/UL — SIGNIFICANT CHANGE UP (ref 0–0.5)
EOSINOPHIL NFR BLD AUTO: 0.1 % — SIGNIFICANT CHANGE UP (ref 0–6)
EPI CELLS # UR: PRESENT
FLUAV AG NPH QL: SIGNIFICANT CHANGE UP
FLUBV AG NPH QL: SIGNIFICANT CHANGE UP
GLUCOSE SERPL-MCNC: 105 MG/DL — HIGH (ref 70–99)
GLUCOSE UR QL: NEGATIVE MG/DL — SIGNIFICANT CHANGE UP
HCG SERPL-ACNC: <1 MIU/ML — SIGNIFICANT CHANGE UP
HCT VFR BLD CALC: 47.7 % — HIGH (ref 34.5–45)
HGB BLD-MCNC: 17.9 G/DL — HIGH (ref 11.5–15.5)
IMM GRANULOCYTES NFR BLD AUTO: 0.5 % — SIGNIFICANT CHANGE UP (ref 0–0.9)
INR BLD: 1.17 RATIO — HIGH (ref 0.85–1.16)
KETONES UR-MCNC: 80 MG/DL
LACTATE SERPL-SCNC: 1.9 MMOL/L — SIGNIFICANT CHANGE UP (ref 0.7–2)
LEUKOCYTE ESTERASE UR-ACNC: ABNORMAL
LYMPHOCYTES # BLD AUTO: 1.61 K/UL — SIGNIFICANT CHANGE UP (ref 1–3.3)
LYMPHOCYTES # BLD AUTO: 14.5 % — SIGNIFICANT CHANGE UP (ref 13–44)
MCHC RBC-ENTMCNC: 31.6 PG — SIGNIFICANT CHANGE UP (ref 27–34)
MCHC RBC-ENTMCNC: 37.5 G/DL — HIGH (ref 32–36)
MCV RBC AUTO: 84.3 FL — SIGNIFICANT CHANGE UP (ref 80–100)
MONOCYTES # BLD AUTO: 1.02 K/UL — HIGH (ref 0–0.9)
MONOCYTES NFR BLD AUTO: 9.2 % — SIGNIFICANT CHANGE UP (ref 2–14)
NEUTROPHILS # BLD AUTO: 8.36 K/UL — HIGH (ref 1.8–7.4)
NEUTROPHILS NFR BLD AUTO: 75.4 % — SIGNIFICANT CHANGE UP (ref 43–77)
NITRITE UR-MCNC: NEGATIVE — SIGNIFICANT CHANGE UP
NRBC BLD AUTO-RTO: 0 /100 WBCS — SIGNIFICANT CHANGE UP (ref 0–0)
PH UR: 6 — SIGNIFICANT CHANGE UP (ref 5–8)
PLATELET # BLD AUTO: 352 K/UL — SIGNIFICANT CHANGE UP (ref 150–400)
POTASSIUM SERPL-MCNC: 3.1 MMOL/L — LOW (ref 3.5–5.3)
POTASSIUM SERPL-SCNC: 3.1 MMOL/L — LOW (ref 3.5–5.3)
PROT SERPL-MCNC: 8.4 G/DL — HIGH (ref 6–8.3)
PROT UR-MCNC: 100 MG/DL
PROTHROM AB SERPL-ACNC: 13.7 SEC — HIGH (ref 9.9–13.4)
RBC # BLD: 5.66 M/UL — HIGH (ref 3.8–5.2)
RBC # FLD: 11.5 % — SIGNIFICANT CHANGE UP (ref 10.3–14.5)
RBC CASTS # UR COMP ASSIST: 4 /HPF — SIGNIFICANT CHANGE UP (ref 0–4)
RSV RNA NPH QL NAA+NON-PROBE: SIGNIFICANT CHANGE UP
SARS-COV-2 RNA SPEC QL NAA+PROBE: SIGNIFICANT CHANGE UP
SODIUM SERPL-SCNC: 129 MMOL/L — LOW (ref 135–145)
SOURCE RESPIRATORY: SIGNIFICANT CHANGE UP
SP GR SPEC: 1.03 — SIGNIFICANT CHANGE UP (ref 1–1.03)
UROBILINOGEN FLD QL: 1 MG/DL — SIGNIFICANT CHANGE UP (ref 0.2–1)
WBC # BLD: 11.08 K/UL — HIGH (ref 3.8–10.5)
WBC # FLD AUTO: 11.08 K/UL — HIGH (ref 3.8–10.5)
WBC UR QL: 2 /HPF — SIGNIFICANT CHANGE UP (ref 0–5)

## 2025-05-04 PROCEDURE — 96361 HYDRATE IV INFUSION ADD-ON: CPT

## 2025-05-04 PROCEDURE — 84702 CHORIONIC GONADOTROPIN TEST: CPT

## 2025-05-04 PROCEDURE — 81001 URINALYSIS AUTO W/SCOPE: CPT

## 2025-05-04 PROCEDURE — 36415 COLL VENOUS BLD VENIPUNCTURE: CPT

## 2025-05-04 PROCEDURE — 93005 ELECTROCARDIOGRAM TRACING: CPT

## 2025-05-04 PROCEDURE — 83605 ASSAY OF LACTIC ACID: CPT

## 2025-05-04 PROCEDURE — 71045 X-RAY EXAM CHEST 1 VIEW: CPT | Mod: 26

## 2025-05-04 PROCEDURE — 99285 EMERGENCY DEPT VISIT HI MDM: CPT | Mod: 25

## 2025-05-04 PROCEDURE — 96372 THER/PROPH/DIAG INJ SC/IM: CPT | Mod: XU

## 2025-05-04 PROCEDURE — 85025 COMPLETE CBC W/AUTO DIFF WBC: CPT

## 2025-05-04 PROCEDURE — 87040 BLOOD CULTURE FOR BACTERIA: CPT

## 2025-05-04 PROCEDURE — 99285 EMERGENCY DEPT VISIT HI MDM: CPT

## 2025-05-04 PROCEDURE — 74177 CT ABD & PELVIS W/CONTRAST: CPT | Mod: 26

## 2025-05-04 PROCEDURE — 96374 THER/PROPH/DIAG INJ IV PUSH: CPT | Mod: XU

## 2025-05-04 PROCEDURE — 85730 THROMBOPLASTIN TIME PARTIAL: CPT

## 2025-05-04 PROCEDURE — 87637 SARSCOV2&INF A&B&RSV AMP PRB: CPT

## 2025-05-04 PROCEDURE — 74177 CT ABD & PELVIS W/CONTRAST: CPT | Mod: MC

## 2025-05-04 PROCEDURE — 85610 PROTHROMBIN TIME: CPT

## 2025-05-04 PROCEDURE — 80053 COMPREHEN METABOLIC PANEL: CPT

## 2025-05-04 PROCEDURE — 93010 ELECTROCARDIOGRAM REPORT: CPT

## 2025-05-04 PROCEDURE — 71045 X-RAY EXAM CHEST 1 VIEW: CPT

## 2025-05-04 RX ORDER — ACETAMINOPHEN 500 MG/5ML
650 LIQUID (ML) ORAL ONCE
Refills: 0 | Status: COMPLETED | OUTPATIENT
Start: 2025-05-04 | End: 2025-05-04

## 2025-05-04 RX ORDER — HALOPERIDOL 10 MG/1
2.5 TABLET ORAL ONCE
Refills: 0 | Status: COMPLETED | OUTPATIENT
Start: 2025-05-04 | End: 2025-05-04

## 2025-05-04 RX ADMIN — Medication 2500 MILLILITER(S): at 13:43

## 2025-05-04 RX ADMIN — HALOPERIDOL 2.5 MILLIGRAM(S): 10 TABLET ORAL at 15:46

## 2025-05-04 RX ADMIN — Medication 650 MILLIGRAM(S): at 13:43

## 2025-05-04 RX ADMIN — Medication 2500 MILLILITER(S): at 14:30

## 2025-05-04 RX ADMIN — Medication 20 MILLIGRAM(S): at 15:48

## 2025-05-04 RX ADMIN — Medication 40 MILLIEQUIVALENT(S): at 17:20

## 2025-05-04 NOTE — ED PROVIDER NOTE - CARE PROVIDER_API CALL
Larissa Green  Gastroenterology  [address]  [place] [address]  Phone: [phone]  Fax: [phone]  Follow Up Time: 7-10 Days

## 2025-05-04 NOTE — ED ADULT NURSE NOTE - NSFALLUNIVINTERV_ED_ALL_ED
Bed/Stretcher in lowest position, wheels locked, appropriate side rails in place/Call bell, personal items and telephone in reach/Instruct patient to call for assistance before getting out of bed/chair/stretcher/Non-slip footwear applied when patient is off stretcher/[place] to call system/Physically safe environment - no spills, clutter or unnecessary equipment/Purposeful proactive rounding/Room/bathroom lighting operational, light cord in reach

## 2025-05-04 NOTE — ED PROVIDER NOTE - OBJECTIVE STATEMENT
40-year-old female comes in she states that she has been vomiting for a week was admitted to the hospital [facility] continues to vomit she was told it was secondary to marijuana use.  Patient thinks it secondary to something she ate.  Patient presents today with a heart rate of 120 and a core temperature of 100.9 a code sepsis protocol was initiated antibiotics are not given focus is not clear.  There is no cough  no urinary complaints no abdominal pain.

## 2025-05-04 NOTE — ED ADULT NURSE NOTE - OBJECTIVE STATEMENT
Assumed pt care for a 40 yr old female alert and oriented x3 able to speak in full sentences. Pt rpeorts recent admission to hospital and discharged. Reports chills, vomiting and not feeling well. Pt denies any further complaints. Pt noted to be tachycardic and febrile. Sepsis bundle initiated, MD made aware. Awaiting further disposition.

## 2025-05-04 NOTE — ED PROVIDER NOTE - PROGRESS NOTE DETAILS
Labs within normal limits.   Electrolytes repleted. Denies any dysuria.  CT within normal limits.   Symptoms resolved.  Belly soft nontender no rebound no guarding no peritoneal signs tolerating p.o. well-appearing resting comfortably vital signs stable.  Requesting to go home.  Patient has antiemetic prescription at home.  Ready for discharge with outpatient gastroenterology follow-up. Bianca Engle MD.

## 2025-05-04 NOTE — ED PROVIDER NOTE - PATIENT PORTAL LINK FT
You can access the FollowMyHealth Patient Portal offered by [facility] by registering at the following website: [email or web address]. By joining [facility]’s FollowMyHealth portal, you will also be able to view your health information using other applications (apps) compatible with our system.

## 2025-05-04 NOTE — ED PROVIDER NOTE - CLINICAL SUMMARY MEDICAL DECISION MAKING FREE TEXT BOX
40-year-old female comes in she states that she has been vomiting for a week was admitted to the hospital and [facility] she continues to vomit she was told it was secondary to marijuana use.  Patient thinks it secondary to something she ate.  Patient presents today with a heart rate of 120 and a core temperature of 100.9 a code sepsis protocol was initiated antibiotics are not given focus is not clear.  There is no cough  no urinary complaints no abdominal pain .  patient denies drinking alcohol.  Skin is warm patient is awake alert and oriented nontoxic-appearing neuro is nonfocal neck is supple not actively vomiting breath sounds are clear breath sounds are equal bilaterally cardiac sinus tachycardia.  Regular rate.  No murmurs rubs or gallops.  Abdomen is soft nontender.  Moving all extremities.  Plan CBC, chemistry pregnancy IV hydration chest x-ray is negative urine is negative will do a CAT scan of abdomen and pelvis since we have a febrile patient with no focus.

## 2025-05-09 LAB
CULTURE RESULTS: SIGNIFICANT CHANGE UP
CULTURE RESULTS: SIGNIFICANT CHANGE UP
SPECIMEN SOURCE: SIGNIFICANT CHANGE UP
SPECIMEN SOURCE: SIGNIFICANT CHANGE UP

## 2025-06-05 NOTE — ED ADULT TRIAGE NOTE - HEIGHT IN INCHES
Sania Bacon MD  Internal Medicine    [facility]   [address] [place] [address]  [phone]      CHAPERONE    Patient has been informed of their right to request the presence of a chaperone during the examination.   Patient has been informed of the reason(s) for the exam of a sensitive nature and of their right to have a chaperone present during the exam.  Patient has declined the offer to have a chaperone.    Patient ID: Peri Aquino is a 69 year old female.     Chief complaint: annual PE    HPI    Patient is here for an annual preventive exam    Past medical history-  Patient Active Problem List   Diagnosis   • Benign essential hypertension   • Vitamin D deficiency   • Type 2 diabetes mellitus with diabetic microalbuminuria, without long-term current use of insulin  (CMD)   • Pure hypercholesterolemia, unspecified   • Class 2 severe obesity due to excess calories with serious comorbidity and body mass index (BMI) of 35.0 to 35.9 in adult (CMD)       Surgeries-   Past Surgical History:   Procedure Laterality Date   • Cyst removal Right     R hand   • Hernia repair     • Tonsillectomy         Medications-  Home Medications    Medication Directions Start Date End Date   hydroCHLOROthiazide 12.5 MG tablet Take 1 tablet by mouth daily. 5/13/25 5/13/26   atorvastatin (LIPITOR) 40 MG tablet Take0 1 tablet by mouth daily. MAKE YOUR FOLLOW UP APPT AFTER 3/1/25 12/28/24      ALLERGIES:  Seasonal    SOCIAL HISTORY:  Social History     Socioeconomic History   • Marital status: /Civil Union   Tobacco Use   • Smoking status: Never   • Smokeless tobacco: Never   Vaping Use   • Vaping status: never used   Substance and Sexual Activity   • Alcohol use: Yes     Alcohol/week: 1.0 standard drink of alcohol     Types: 1 Glasses of wine per week     Comment: socially   • Drug use: No   • Sexual activity: Yes     Partners: Male     Social Drivers of Health     Food Insecurity: Low  Risk  (5/29/2025)    Food Insecurity    • Worried about Food: Never true    • Food is Gone: Never true   Transportation Needs: Not At Risk (5/29/2025)    Transportation Needs    • Lack of Reliable Transportation: No       FAMILY HISTORY:  Family History   Problem Relation Age of Onset   • Cancer Mother    • Heart disease Father        Review of Systems    The patient denies headaches, dizziness, visual disturbance, nasal congestion, sore throats, coughing, CP, SOB, AP, nausea, vomiting, diarrhea, blood in the urine or stool, dysuria, joint pain or swelling, numbness, tingling or weakness in the extremities, chronic fatigue, weight loss or gain, fevers, chills, night sweats.    Patient's past medical, surgical, social and family histories were reviewed and updated as appropriate.    Vitals:  Blood pressure (!) 141/82, pulse 76, height 5' 3\" (1.6 m), weight 88.9 kg (195 lb 14.1 oz), SpO2 99%.    Physical Exam      GENERAL: well nourished, well developed, in no acute distress  HEENT:NC/AT, anicteric, EOMI, no hemorrhage or exudate, external ear and ear canals WNL, TMs intact without inflammation, fluid, or perforation, no nasal deformity, teeth and o/p nl  NECK: supple, full ROM, no thyromegaly or adenopathy, carotids without bruit  BREASTS: no masses  LUNGS: clear to auscultation, no wheezes, rhonchi, rales  CV: RRR, no murmur, no S3, no S4  ABDOMEN: soft, NT, ND, no HSM, nl bowel sounds  : per gyne  MUSCULOSKELETAL: good strength and FROM of all 4 extremities, no joint erythema or swelling noted, FROM of lower back  PULSES: 2+ DP pulses   EXTREMITIES: no cyanosis, clubbing, edema  NEURO: no AMS, CN II-XII nl, DTR 2+ at patella and brachioradialis, nl gait  LYMPH NODES: no significant LAD  Skin: No rash or lesions  Psychiatric: Alert, oriented, normal attention span    ASSESSMENT AND PLAN  HEALTH MAINTENANCE  PLAN: Check CBC, CMP, TSH, lipids, UA, iFOBT.   SBE, exercise and a good diet encouraged.   Pap per gyne and  UTD.   Mammogram and DEXA and Colonoscopy ordered.   Encouraged to get flu shot in the fall.    Smoking assessed and advised to remain a non smoker.        Immunization History   Administered Date(s) Administered   • COVID Moderna 0.5 mL 12Y+ 02/06/2021, 03/06/2021   • COVID Moderna Booster 0.25 mL 12Y+ 12/20/2021   • Influenza, split virus, quadrivalent, PF 10/17/2020, 10/30/2021         Follow up: pending labs  Patient has been given the opportunity to ask questions, and I have attempted to answer these questions to the best of my ability. Patient verbalizes understanding of the diagnosis and plan and has no further questions today.    Discussed medication dosage, usage, goals of therapy, and side effects.  The patient indicates understanding of these issues and agrees with the plan.    Sania Bacon MD  Internal Medicine                 3

## 2025-07-26 ENCOUNTER — INPATIENT (INPATIENT)
Facility: HOSPITAL | Age: 40
LOS: 4 days | Discharge: HOME | DRG: 392 | End: 2025-07-31
Attending: INTERNAL MEDICINE | Admitting: INTERNAL MEDICINE
Payer: MEDICAID

## 2025-07-26 VITALS
RESPIRATION RATE: 18 BRPM | OXYGEN SATURATION: 98 % | WEIGHT: 179.9 LBS | SYSTOLIC BLOOD PRESSURE: 142 MMHG | DIASTOLIC BLOOD PRESSURE: 91 MMHG | TEMPERATURE: 98 F | HEART RATE: 100 BPM | HEIGHT: 62 IN

## 2025-07-26 DIAGNOSIS — K52.9 NONINFECTIVE GASTROENTERITIS AND COLITIS, UNSPECIFIED: ICD-10-CM

## 2025-07-26 DIAGNOSIS — R11.2 NAUSEA WITH VOMITING, UNSPECIFIED: ICD-10-CM

## 2025-07-26 DIAGNOSIS — I10 ESSENTIAL (PRIMARY) HYPERTENSION: ICD-10-CM

## 2025-07-26 DIAGNOSIS — R11.10 VOMITING, UNSPECIFIED: ICD-10-CM

## 2025-07-26 DIAGNOSIS — Z29.9 ENCOUNTER FOR PROPHYLACTIC MEASURES, UNSPECIFIED: ICD-10-CM

## 2025-07-26 DIAGNOSIS — Z90.49 ACQUIRED ABSENCE OF OTHER SPECIFIED PARTS OF DIGESTIVE TRACT: Chronic | ICD-10-CM

## 2025-07-26 DIAGNOSIS — R07.9 CHEST PAIN, UNSPECIFIED: ICD-10-CM

## 2025-07-26 DIAGNOSIS — J45.909 UNSPECIFIED ASTHMA, UNCOMPLICATED: ICD-10-CM

## 2025-07-26 DIAGNOSIS — A41.9 SEPSIS, UNSPECIFIED ORGANISM: ICD-10-CM

## 2025-07-26 LAB
ALBUMIN SERPL ELPH-MCNC: 4.6 G/DL — SIGNIFICANT CHANGE UP (ref 3.5–5)
ALBUMIN SERPL ELPH-MCNC: 4.7 G/DL — SIGNIFICANT CHANGE UP (ref 3.5–5)
ALP SERPL-CCNC: 68 U/L — SIGNIFICANT CHANGE UP (ref 40–120)
ALP SERPL-CCNC: 69 U/L — SIGNIFICANT CHANGE UP (ref 40–120)
ALT FLD-CCNC: 35 U/L DA — SIGNIFICANT CHANGE UP (ref 10–60)
ALT FLD-CCNC: 35 U/L DA — SIGNIFICANT CHANGE UP (ref 10–60)
ANION GAP SERPL CALC-SCNC: 7 MMOL/L — SIGNIFICANT CHANGE UP (ref 5–17)
ANION GAP SERPL CALC-SCNC: 8 MMOL/L — SIGNIFICANT CHANGE UP (ref 5–17)
ANISOCYTOSIS BLD QL: SLIGHT — SIGNIFICANT CHANGE UP
APPEARANCE UR: CLEAR — SIGNIFICANT CHANGE UP
APTT BLD: 29.3 SEC — SIGNIFICANT CHANGE UP (ref 26.1–36.8)
AST SERPL-CCNC: 12 U/L — SIGNIFICANT CHANGE UP (ref 10–40)
AST SERPL-CCNC: 13 U/L — SIGNIFICANT CHANGE UP (ref 10–40)
BASOPHILS # BLD AUTO: 0.02 K/UL — SIGNIFICANT CHANGE UP (ref 0–0.2)
BASOPHILS # BLD AUTO: 0.03 K/UL — SIGNIFICANT CHANGE UP (ref 0–0.2)
BASOPHILS NFR BLD AUTO: 0.2 % — SIGNIFICANT CHANGE UP (ref 0–2)
BASOPHILS NFR BLD AUTO: 0.2 % — SIGNIFICANT CHANGE UP (ref 0–2)
BILIRUB SERPL-MCNC: 0.5 MG/DL — SIGNIFICANT CHANGE UP (ref 0.2–1.2)
BILIRUB SERPL-MCNC: 0.5 MG/DL — SIGNIFICANT CHANGE UP (ref 0.2–1.2)
BILIRUB UR-MCNC: NEGATIVE — SIGNIFICANT CHANGE UP
BUN SERPL-MCNC: 10 MG/DL — SIGNIFICANT CHANGE UP (ref 7–18)
BUN SERPL-MCNC: 13 MG/DL — SIGNIFICANT CHANGE UP (ref 7–18)
CALCIUM SERPL-MCNC: 9.4 MG/DL — SIGNIFICANT CHANGE UP (ref 8.4–10.5)
CALCIUM SERPL-MCNC: 9.6 MG/DL — SIGNIFICANT CHANGE UP (ref 8.4–10.5)
CHLORIDE SERPL-SCNC: 107 MMOL/L — SIGNIFICANT CHANGE UP (ref 96–108)
CHLORIDE SERPL-SCNC: 108 MMOL/L — SIGNIFICANT CHANGE UP (ref 96–108)
CO2 SERPL-SCNC: 24 MMOL/L — SIGNIFICANT CHANGE UP (ref 22–31)
CO2 SERPL-SCNC: 25 MMOL/L — SIGNIFICANT CHANGE UP (ref 22–31)
COLOR SPEC: YELLOW — SIGNIFICANT CHANGE UP
CREAT SERPL-MCNC: 0.76 MG/DL — SIGNIFICANT CHANGE UP (ref 0.5–1.3)
CREAT SERPL-MCNC: 0.77 MG/DL — SIGNIFICANT CHANGE UP (ref 0.5–1.3)
CRP SERPL-MCNC: 7.3 MG/L — HIGH (ref 0–5)
DIFF PNL FLD: ABNORMAL
EGFR: 101 ML/MIN/1.73M2 — SIGNIFICANT CHANGE UP
EGFR: 101 ML/MIN/1.73M2 — SIGNIFICANT CHANGE UP
EGFR: 103 ML/MIN/1.73M2 — SIGNIFICANT CHANGE UP
EGFR: 103 ML/MIN/1.73M2 — SIGNIFICANT CHANGE UP
EOSINOPHIL # BLD AUTO: 0 K/UL — SIGNIFICANT CHANGE UP (ref 0–0.5)
EOSINOPHIL # BLD AUTO: 0 K/UL — SIGNIFICANT CHANGE UP (ref 0–0.5)
EOSINOPHIL NFR BLD AUTO: 0 % — SIGNIFICANT CHANGE UP (ref 0–6)
EOSINOPHIL NFR BLD AUTO: 0 % — SIGNIFICANT CHANGE UP (ref 0–6)
EPI CELLS # UR: PRESENT
ERYTHROCYTE [SEDIMENTATION RATE] IN BLOOD: 12 MM/HR — SIGNIFICANT CHANGE UP (ref 0–20)
FLUAV AG NPH QL: SIGNIFICANT CHANGE UP
FLUBV AG NPH QL: SIGNIFICANT CHANGE UP
GLUCOSE SERPL-MCNC: 130 MG/DL — HIGH (ref 70–99)
GLUCOSE SERPL-MCNC: 155 MG/DL — HIGH (ref 70–99)
GLUCOSE UR QL: 100 MG/DL
HCG SERPL-ACNC: <1 MIU/ML — SIGNIFICANT CHANGE UP
HCT VFR BLD CALC: 39.9 % — SIGNIFICANT CHANGE UP (ref 34.5–45)
HCT VFR BLD CALC: 42 % — SIGNIFICANT CHANGE UP (ref 34.5–45)
HGB BLD-MCNC: 14 G/DL — SIGNIFICANT CHANGE UP (ref 11.5–15.5)
HGB BLD-MCNC: 15 G/DL — SIGNIFICANT CHANGE UP (ref 11.5–15.5)
IMM GRANULOCYTES NFR BLD AUTO: 0.3 % — SIGNIFICANT CHANGE UP (ref 0–0.9)
IMM GRANULOCYTES NFR BLD AUTO: 0.3 % — SIGNIFICANT CHANGE UP (ref 0–0.9)
INR BLD: 1.17 RATIO — HIGH (ref 0.85–1.16)
KETONES UR QL: 80 MG/DL
LACTATE SERPL-SCNC: 1.3 MMOL/L — SIGNIFICANT CHANGE UP (ref 0.7–2)
LEUKOCYTE ESTERASE UR-ACNC: NEGATIVE — SIGNIFICANT CHANGE UP
LIDOCAIN IGE QN: 118 U/L — HIGH (ref 13–75)
LYMPHOCYTES # BLD AUTO: 0.46 K/UL — LOW (ref 1–3.3)
LYMPHOCYTES # BLD AUTO: 0.8 K/UL — LOW (ref 1–3.3)
LYMPHOCYTES # BLD AUTO: 4.2 % — LOW (ref 13–44)
LYMPHOCYTES # BLD AUTO: 4.6 % — LOW (ref 13–44)
MACROCYTES BLD QL: SLIGHT — SIGNIFICANT CHANGE UP
MAGNESIUM SERPL-MCNC: 2 MG/DL — SIGNIFICANT CHANGE UP (ref 1.6–2.6)
MANUAL SMEAR VERIFICATION: SIGNIFICANT CHANGE UP
MCHC RBC-ENTMCNC: 32.2 PG — SIGNIFICANT CHANGE UP (ref 27–34)
MCHC RBC-ENTMCNC: 32.3 PG — SIGNIFICANT CHANGE UP (ref 27–34)
MCHC RBC-ENTMCNC: 35.1 G/DL — SIGNIFICANT CHANGE UP (ref 32–36)
MCHC RBC-ENTMCNC: 35.7 G/DL — SIGNIFICANT CHANGE UP (ref 32–36)
MCV RBC AUTO: 90.3 FL — SIGNIFICANT CHANGE UP (ref 80–100)
MCV RBC AUTO: 91.7 FL — SIGNIFICANT CHANGE UP (ref 80–100)
MONOCYTES # BLD AUTO: 0.19 K/UL — SIGNIFICANT CHANGE UP (ref 0–0.9)
MONOCYTES # BLD AUTO: 0.61 K/UL — SIGNIFICANT CHANGE UP (ref 0–0.9)
MONOCYTES NFR BLD AUTO: 1.7 % — LOW (ref 2–14)
MONOCYTES NFR BLD AUTO: 3.5 % — SIGNIFICANT CHANGE UP (ref 2–14)
NEUTROPHILS # BLD AUTO: 10.22 K/UL — HIGH (ref 1.8–7.4)
NEUTROPHILS # BLD AUTO: 16.05 K/UL — HIGH (ref 1.8–7.4)
NEUTROPHILS NFR BLD AUTO: 91.4 % — HIGH (ref 43–77)
NEUTROPHILS NFR BLD AUTO: 93.6 % — HIGH (ref 43–77)
NITRITE UR-MCNC: NEGATIVE — SIGNIFICANT CHANGE UP
NRBC BLD AUTO-RTO: 0 /100 WBCS — SIGNIFICANT CHANGE UP (ref 0–0)
NRBC BLD AUTO-RTO: 0 /100 WBCS — SIGNIFICANT CHANGE UP (ref 0–0)
PH UR: 8 — SIGNIFICANT CHANGE UP (ref 5–8)
PHOSPHATE SERPL-MCNC: 2.7 MG/DL — SIGNIFICANT CHANGE UP (ref 2.5–4.5)
PLAT MORPH BLD: NORMAL — SIGNIFICANT CHANGE UP
PLATELET # BLD AUTO: 236 K/UL — SIGNIFICANT CHANGE UP (ref 150–400)
PLATELET # BLD AUTO: 253 K/UL — SIGNIFICANT CHANGE UP (ref 150–400)
POTASSIUM SERPL-MCNC: 3.6 MMOL/L — SIGNIFICANT CHANGE UP (ref 3.5–5.3)
POTASSIUM SERPL-MCNC: 3.6 MMOL/L — SIGNIFICANT CHANGE UP (ref 3.5–5.3)
POTASSIUM SERPL-SCNC: 3.6 MMOL/L — SIGNIFICANT CHANGE UP (ref 3.5–5.3)
POTASSIUM SERPL-SCNC: 3.6 MMOL/L — SIGNIFICANT CHANGE UP (ref 3.5–5.3)
PROT SERPL-MCNC: 8 G/DL — SIGNIFICANT CHANGE UP (ref 6–8.3)
PROT SERPL-MCNC: 8.2 G/DL — SIGNIFICANT CHANGE UP (ref 6–8.3)
PROT UR-MCNC: 30 MG/DL
PROTHROM AB SERPL-ACNC: 13.6 SEC — HIGH (ref 9.9–13.4)
RBC # BLD: 4.35 M/UL — SIGNIFICANT CHANGE UP (ref 3.8–5.2)
RBC # BLD: 4.65 M/UL — SIGNIFICANT CHANGE UP (ref 3.8–5.2)
RBC # FLD: 11.4 % — SIGNIFICANT CHANGE UP (ref 10.3–14.5)
RBC # FLD: 11.4 % — SIGNIFICANT CHANGE UP (ref 10.3–14.5)
RBC BLD AUTO: NORMAL — SIGNIFICANT CHANGE UP
RBC CASTS # UR COMP ASSIST: 15 /HPF — HIGH (ref 0–4)
RSV RNA NPH QL NAA+NON-PROBE: SIGNIFICANT CHANGE UP
SARS-COV-2 RNA SPEC QL NAA+PROBE: SIGNIFICANT CHANGE UP
SODIUM SERPL-SCNC: 139 MMOL/L — SIGNIFICANT CHANGE UP (ref 135–145)
SODIUM SERPL-SCNC: 140 MMOL/L — SIGNIFICANT CHANGE UP (ref 135–145)
SOURCE RESPIRATORY: SIGNIFICANT CHANGE UP
SP GR SPEC: 1.06 — HIGH (ref 1–1.03)
TROPONIN I, HIGH SENSITIVITY RESULT: <3 NG/L — SIGNIFICANT CHANGE UP
UROBILINOGEN FLD QL: 0.2 MG/DL — SIGNIFICANT CHANGE UP (ref 0.2–1)
WBC # BLD: 10.92 K/UL — HIGH (ref 3.8–10.5)
WBC # BLD: 17.54 K/UL — HIGH (ref 3.8–10.5)
WBC # FLD AUTO: 10.92 K/UL — HIGH (ref 3.8–10.5)
WBC # FLD AUTO: 17.54 K/UL — HIGH (ref 3.8–10.5)
WBC UR QL: 0 /HPF — SIGNIFICANT CHANGE UP (ref 0–5)

## 2025-07-26 PROCEDURE — 87040 BLOOD CULTURE FOR BACTERIA: CPT

## 2025-07-26 PROCEDURE — 85652 RBC SED RATE AUTOMATED: CPT

## 2025-07-26 PROCEDURE — 81001 URINALYSIS AUTO W/SCOPE: CPT

## 2025-07-26 PROCEDURE — 83605 ASSAY OF LACTIC ACID: CPT

## 2025-07-26 PROCEDURE — 85730 THROMBOPLASTIN TIME PARTIAL: CPT

## 2025-07-26 PROCEDURE — 85025 COMPLETE CBC W/AUTO DIFF WBC: CPT

## 2025-07-26 PROCEDURE — 36415 COLL VENOUS BLD VENIPUNCTURE: CPT

## 2025-07-26 PROCEDURE — 83690 ASSAY OF LIPASE: CPT

## 2025-07-26 PROCEDURE — 87637 SARSCOV2&INF A&B&RSV AMP PRB: CPT

## 2025-07-26 PROCEDURE — 83735 ASSAY OF MAGNESIUM: CPT

## 2025-07-26 PROCEDURE — 84100 ASSAY OF PHOSPHORUS: CPT

## 2025-07-26 PROCEDURE — 74177 CT ABD & PELVIS W/CONTRAST: CPT

## 2025-07-26 PROCEDURE — 71045 X-RAY EXAM CHEST 1 VIEW: CPT | Mod: 26

## 2025-07-26 PROCEDURE — 71045 X-RAY EXAM CHEST 1 VIEW: CPT

## 2025-07-26 PROCEDURE — 74177 CT ABD & PELVIS W/CONTRAST: CPT | Mod: 26

## 2025-07-26 PROCEDURE — 84702 CHORIONIC GONADOTROPIN TEST: CPT

## 2025-07-26 PROCEDURE — 85610 PROTHROMBIN TIME: CPT

## 2025-07-26 PROCEDURE — 86140 C-REACTIVE PROTEIN: CPT

## 2025-07-26 PROCEDURE — 99285 EMERGENCY DEPT VISIT HI MDM: CPT | Mod: 25

## 2025-07-26 PROCEDURE — 84484 ASSAY OF TROPONIN QUANT: CPT

## 2025-07-26 PROCEDURE — 80053 COMPREHEN METABOLIC PANEL: CPT

## 2025-07-26 RX ORDER — CEFTRIAXONE 500 MG/1
1000 INJECTION, POWDER, FOR SOLUTION INTRAMUSCULAR; INTRAVENOUS EVERY 24 HOURS
Refills: 0 | Status: DISCONTINUED | OUTPATIENT
Start: 2025-07-27 | End: 2025-07-31

## 2025-07-26 RX ORDER — ONDANSETRON HCL/PF 4 MG/2 ML
4 VIAL (ML) INJECTION ONCE
Refills: 0 | Status: COMPLETED | OUTPATIENT
Start: 2025-07-26 | End: 2025-07-26

## 2025-07-26 RX ORDER — ENOXAPARIN SODIUM 100 MG/ML
40 INJECTION SUBCUTANEOUS EVERY 24 HOURS
Refills: 0 | Status: DISCONTINUED | OUTPATIENT
Start: 2025-07-26 | End: 2025-07-31

## 2025-07-26 RX ORDER — ACETAMINOPHEN 500 MG/5ML
1000 LIQUID (ML) ORAL ONCE
Refills: 0 | Status: COMPLETED | OUTPATIENT
Start: 2025-07-26 | End: 2025-07-26

## 2025-07-26 RX ORDER — METOCLOPRAMIDE HCL 10 MG
10 TABLET ORAL ONCE
Refills: 0 | Status: COMPLETED | OUTPATIENT
Start: 2025-07-26 | End: 2025-07-26

## 2025-07-26 RX ORDER — ONDANSETRON HCL/PF 4 MG/2 ML
4 VIAL (ML) INJECTION EVERY 8 HOURS
Refills: 0 | Status: DISCONTINUED | OUTPATIENT
Start: 2025-07-26 | End: 2025-07-29

## 2025-07-26 RX ORDER — MELATONIN 5 MG
3 TABLET ORAL AT BEDTIME
Refills: 0 | Status: DISCONTINUED | OUTPATIENT
Start: 2025-07-26 | End: 2025-07-31

## 2025-07-26 RX ORDER — KETOROLAC TROMETHAMINE 30 MG/ML
15 INJECTION, SOLUTION INTRAMUSCULAR; INTRAVENOUS ONCE
Refills: 0 | Status: DISCONTINUED | OUTPATIENT
Start: 2025-07-26 | End: 2025-07-26

## 2025-07-26 RX ORDER — CEFTRIAXONE 500 MG/1
INJECTION, POWDER, FOR SOLUTION INTRAMUSCULAR; INTRAVENOUS
Refills: 0 | Status: DISCONTINUED | OUTPATIENT
Start: 2025-07-26 | End: 2025-07-31

## 2025-07-26 RX ORDER — METRONIDAZOLE 250 MG
500 TABLET ORAL EVERY 8 HOURS
Refills: 0 | Status: DISCONTINUED | OUTPATIENT
Start: 2025-07-26 | End: 2025-07-31

## 2025-07-26 RX ORDER — CEFTRIAXONE 500 MG/1
1000 INJECTION, POWDER, FOR SOLUTION INTRAMUSCULAR; INTRAVENOUS ONCE
Refills: 0 | Status: COMPLETED | OUTPATIENT
Start: 2025-07-26 | End: 2025-07-26

## 2025-07-26 RX ORDER — ACETAMINOPHEN 500 MG/5ML
650 LIQUID (ML) ORAL EVERY 6 HOURS
Refills: 0 | Status: DISCONTINUED | OUTPATIENT
Start: 2025-07-26 | End: 2025-07-31

## 2025-07-26 RX ORDER — AMPICILLIN SODIUM AND SULBACTAM SODIUM 1; .5 G/1; G/1
3 INJECTION, POWDER, FOR SOLUTION INTRAMUSCULAR; INTRAVENOUS ONCE
Refills: 0 | Status: COMPLETED | OUTPATIENT
Start: 2025-07-26 | End: 2025-07-26

## 2025-07-26 RX ADMIN — Medication 1000 MILLILITER(S): at 01:46

## 2025-07-26 RX ADMIN — Medication 1000 MILLIGRAM(S): at 03:45

## 2025-07-26 RX ADMIN — Medication 4 MILLIGRAM(S): at 03:13

## 2025-07-26 RX ADMIN — CEFTRIAXONE 100 MILLIGRAM(S): 500 INJECTION, POWDER, FOR SOLUTION INTRAMUSCULAR; INTRAVENOUS at 12:42

## 2025-07-26 RX ADMIN — Medication 20 MILLIGRAM(S): at 01:47

## 2025-07-26 RX ADMIN — Medication 4 MILLIGRAM(S): at 01:42

## 2025-07-26 RX ADMIN — ENOXAPARIN SODIUM 40 MILLIGRAM(S): 100 INJECTION SUBCUTANEOUS at 18:21

## 2025-07-26 RX ADMIN — Medication 500 MILLIGRAM(S): at 22:01

## 2025-07-26 RX ADMIN — KETOROLAC TROMETHAMINE 15 MILLIGRAM(S): 30 INJECTION, SOLUTION INTRAMUSCULAR; INTRAVENOUS at 01:48

## 2025-07-26 RX ADMIN — Medication 400 MILLIGRAM(S): at 03:12

## 2025-07-26 RX ADMIN — Medication 4 MILLIGRAM(S): at 09:41

## 2025-07-26 RX ADMIN — Medication 4 MILLIGRAM(S): at 18:20

## 2025-07-26 RX ADMIN — Medication 40 MILLIGRAM(S): at 09:41

## 2025-07-26 RX ADMIN — Medication 10 MILLIGRAM(S): at 03:58

## 2025-07-26 RX ADMIN — Medication 3 MILLIGRAM(S): at 22:01

## 2025-07-26 RX ADMIN — AMPICILLIN SODIUM AND SULBACTAM SODIUM 200 GRAM(S): 1; .5 INJECTION, POWDER, FOR SOLUTION INTRAMUSCULAR; INTRAVENOUS at 03:58

## 2025-07-26 RX ADMIN — Medication 1000 MILLILITER(S): at 05:48

## 2025-07-26 RX ADMIN — KETOROLAC TROMETHAMINE 15 MILLIGRAM(S): 30 INJECTION, SOLUTION INTRAMUSCULAR; INTRAVENOUS at 03:45

## 2025-07-26 RX ADMIN — Medication 500 MILLIGRAM(S): at 13:50

## 2025-07-27 LAB
ALBUMIN SERPL ELPH-MCNC: 4.4 G/DL — SIGNIFICANT CHANGE UP (ref 3.5–5)
ALP SERPL-CCNC: 70 U/L — SIGNIFICANT CHANGE UP (ref 40–120)
ALT FLD-CCNC: 31 U/L DA — SIGNIFICANT CHANGE UP (ref 10–60)
AMPHET UR-MCNC: NEGATIVE — SIGNIFICANT CHANGE UP
ANION GAP SERPL CALC-SCNC: 8 MMOL/L — SIGNIFICANT CHANGE UP (ref 5–17)
AST SERPL-CCNC: 11 U/L — SIGNIFICANT CHANGE UP (ref 10–40)
BARBITURATES UR SCN-MCNC: NEGATIVE — SIGNIFICANT CHANGE UP
BASOPHILS # BLD AUTO: 0.02 K/UL — SIGNIFICANT CHANGE UP (ref 0–0.2)
BASOPHILS NFR BLD AUTO: 0.2 % — SIGNIFICANT CHANGE UP (ref 0–2)
BENZODIAZ UR-MCNC: NEGATIVE — SIGNIFICANT CHANGE UP
BILIRUB SERPL-MCNC: 0.7 MG/DL — SIGNIFICANT CHANGE UP (ref 0.2–1.2)
BUN SERPL-MCNC: 11 MG/DL — SIGNIFICANT CHANGE UP (ref 7–18)
CALCIUM SERPL-MCNC: 9.4 MG/DL — SIGNIFICANT CHANGE UP (ref 8.4–10.5)
CHLORIDE SERPL-SCNC: 101 MMOL/L — SIGNIFICANT CHANGE UP (ref 96–108)
CO2 SERPL-SCNC: 27 MMOL/L — SIGNIFICANT CHANGE UP (ref 22–31)
COCAINE METAB.OTHER UR-MCNC: NEGATIVE — SIGNIFICANT CHANGE UP
CREAT SERPL-MCNC: 0.66 MG/DL — SIGNIFICANT CHANGE UP (ref 0.5–1.3)
EGFR: 115 ML/MIN/1.73M2 — SIGNIFICANT CHANGE UP
EGFR: 115 ML/MIN/1.73M2 — SIGNIFICANT CHANGE UP
EOSINOPHIL # BLD AUTO: 0 K/UL — SIGNIFICANT CHANGE UP (ref 0–0.5)
EOSINOPHIL NFR BLD AUTO: 0 % — SIGNIFICANT CHANGE UP (ref 0–6)
GLUCOSE SERPL-MCNC: 102 MG/DL — HIGH (ref 70–99)
HCT VFR BLD CALC: 43.2 % — SIGNIFICANT CHANGE UP (ref 34.5–45)
HGB BLD-MCNC: 15.2 G/DL — SIGNIFICANT CHANGE UP (ref 11.5–15.5)
IMM GRANULOCYTES NFR BLD AUTO: 0.3 % — SIGNIFICANT CHANGE UP (ref 0–0.9)
LYMPHOCYTES # BLD AUTO: 0.96 K/UL — LOW (ref 1–3.3)
LYMPHOCYTES # BLD AUTO: 7.4 % — LOW (ref 13–44)
MAGNESIUM SERPL-MCNC: 2.3 MG/DL — SIGNIFICANT CHANGE UP (ref 1.6–2.6)
MCHC RBC-ENTMCNC: 31.7 PG — SIGNIFICANT CHANGE UP (ref 27–34)
MCHC RBC-ENTMCNC: 35.2 G/DL — SIGNIFICANT CHANGE UP (ref 32–36)
MCV RBC AUTO: 90 FL — SIGNIFICANT CHANGE UP (ref 80–100)
METHADONE UR-MCNC: NEGATIVE — SIGNIFICANT CHANGE UP
MONOCYTES # BLD AUTO: 0.81 K/UL — SIGNIFICANT CHANGE UP (ref 0–0.9)
MONOCYTES NFR BLD AUTO: 6.2 % — SIGNIFICANT CHANGE UP (ref 2–14)
NEUTROPHILS # BLD AUTO: 11.14 K/UL — HIGH (ref 1.8–7.4)
NEUTROPHILS NFR BLD AUTO: 85.9 % — HIGH (ref 43–77)
NRBC BLD AUTO-RTO: 0 /100 WBCS — SIGNIFICANT CHANGE UP (ref 0–0)
OPIATES UR-MCNC: NEGATIVE — SIGNIFICANT CHANGE UP
PCP SPEC-MCNC: SIGNIFICANT CHANGE UP
PCP UR-MCNC: NEGATIVE — SIGNIFICANT CHANGE UP
PHOSPHATE SERPL-MCNC: 2.5 MG/DL — SIGNIFICANT CHANGE UP (ref 2.5–4.5)
PLATELET # BLD AUTO: 256 K/UL — SIGNIFICANT CHANGE UP (ref 150–400)
POTASSIUM SERPL-MCNC: 3.2 MMOL/L — LOW (ref 3.5–5.3)
POTASSIUM SERPL-SCNC: 3.2 MMOL/L — LOW (ref 3.5–5.3)
PROT SERPL-MCNC: 8.1 G/DL — SIGNIFICANT CHANGE UP (ref 6–8.3)
RBC # BLD: 4.8 M/UL — SIGNIFICANT CHANGE UP (ref 3.8–5.2)
RBC # FLD: 11.6 % — SIGNIFICANT CHANGE UP (ref 10.3–14.5)
SODIUM SERPL-SCNC: 136 MMOL/L — SIGNIFICANT CHANGE UP (ref 135–145)
THC UR QL: POSITIVE
WBC # BLD: 12.97 K/UL — HIGH (ref 3.8–10.5)
WBC # FLD AUTO: 12.97 K/UL — HIGH (ref 3.8–10.5)

## 2025-07-27 PROCEDURE — 80053 COMPREHEN METABOLIC PANEL: CPT

## 2025-07-27 PROCEDURE — 81001 URINALYSIS AUTO W/SCOPE: CPT

## 2025-07-27 PROCEDURE — 83735 ASSAY OF MAGNESIUM: CPT

## 2025-07-27 PROCEDURE — 83690 ASSAY OF LIPASE: CPT

## 2025-07-27 PROCEDURE — 80307 DRUG TEST PRSMV CHEM ANLYZR: CPT

## 2025-07-27 PROCEDURE — 36415 COLL VENOUS BLD VENIPUNCTURE: CPT

## 2025-07-27 PROCEDURE — 87637 SARSCOV2&INF A&B&RSV AMP PRB: CPT

## 2025-07-27 PROCEDURE — 86140 C-REACTIVE PROTEIN: CPT

## 2025-07-27 PROCEDURE — 84484 ASSAY OF TROPONIN QUANT: CPT

## 2025-07-27 PROCEDURE — 71045 X-RAY EXAM CHEST 1 VIEW: CPT

## 2025-07-27 PROCEDURE — 84100 ASSAY OF PHOSPHORUS: CPT

## 2025-07-27 PROCEDURE — 85610 PROTHROMBIN TIME: CPT

## 2025-07-27 PROCEDURE — 85730 THROMBOPLASTIN TIME PARTIAL: CPT

## 2025-07-27 PROCEDURE — 85652 RBC SED RATE AUTOMATED: CPT

## 2025-07-27 PROCEDURE — 74177 CT ABD & PELVIS W/CONTRAST: CPT

## 2025-07-27 PROCEDURE — 83605 ASSAY OF LACTIC ACID: CPT

## 2025-07-27 PROCEDURE — 84702 CHORIONIC GONADOTROPIN TEST: CPT

## 2025-07-27 PROCEDURE — 85025 COMPLETE CBC W/AUTO DIFF WBC: CPT

## 2025-07-27 PROCEDURE — 87040 BLOOD CULTURE FOR BACTERIA: CPT

## 2025-07-27 RX ORDER — ONDANSETRON HCL/PF 4 MG/2 ML
4 VIAL (ML) INJECTION ONCE
Refills: 0 | Status: COMPLETED | OUTPATIENT
Start: 2025-07-27 | End: 2025-07-27

## 2025-07-27 RX ORDER — MAGNESIUM, ALUMINUM HYDROXIDE 200-200 MG
30 TABLET,CHEWABLE ORAL ONCE
Refills: 0 | Status: COMPLETED | OUTPATIENT
Start: 2025-07-27 | End: 2025-07-27

## 2025-07-27 RX ADMIN — Medication 500 MILLIGRAM(S): at 05:52

## 2025-07-27 RX ADMIN — Medication 30 MILLILITER(S): at 05:52

## 2025-07-27 RX ADMIN — Medication 4 MILLIGRAM(S): at 17:17

## 2025-07-27 RX ADMIN — Medication 4 MILLIGRAM(S): at 20:36

## 2025-07-27 RX ADMIN — Medication 500 MILLIGRAM(S): at 22:06

## 2025-07-27 RX ADMIN — Medication 40 MILLIGRAM(S): at 09:45

## 2025-07-27 RX ADMIN — Medication 500 MILLIGRAM(S): at 13:33

## 2025-07-27 RX ADMIN — Medication 100 MILLIEQUIVALENT(S): at 08:25

## 2025-07-27 RX ADMIN — Medication 4 MILLIGRAM(S): at 05:40

## 2025-07-27 RX ADMIN — Medication 100 MILLIEQUIVALENT(S): at 12:11

## 2025-07-27 RX ADMIN — Medication 650 MILLIGRAM(S): at 17:19

## 2025-07-27 RX ADMIN — Medication 100 MILLIEQUIVALENT(S): at 13:33

## 2025-07-27 RX ADMIN — CEFTRIAXONE 100 MILLIGRAM(S): 500 INJECTION, POWDER, FOR SOLUTION INTRAMUSCULAR; INTRAVENOUS at 08:25

## 2025-07-27 RX ADMIN — ENOXAPARIN SODIUM 40 MILLIGRAM(S): 100 INJECTION SUBCUTANEOUS at 17:23

## 2025-07-27 RX ADMIN — Medication 650 MILLIGRAM(S): at 18:42

## 2025-07-28 LAB
ALBUMIN SERPL ELPH-MCNC: 4.2 G/DL — SIGNIFICANT CHANGE UP (ref 3.5–5)
ALP SERPL-CCNC: 67 U/L — SIGNIFICANT CHANGE UP (ref 40–120)
ALT FLD-CCNC: 27 U/L DA — SIGNIFICANT CHANGE UP (ref 10–60)
ANION GAP SERPL CALC-SCNC: 8 MMOL/L — SIGNIFICANT CHANGE UP (ref 5–17)
AST SERPL-CCNC: 14 U/L — SIGNIFICANT CHANGE UP (ref 10–40)
BASOPHILS # BLD AUTO: 0.02 K/UL — SIGNIFICANT CHANGE UP (ref 0–0.2)
BASOPHILS NFR BLD AUTO: 0.2 % — SIGNIFICANT CHANGE UP (ref 0–2)
BILIRUB SERPL-MCNC: 0.8 MG/DL — SIGNIFICANT CHANGE UP (ref 0.2–1.2)
BUN SERPL-MCNC: 13 MG/DL — SIGNIFICANT CHANGE UP (ref 7–18)
CALCIUM SERPL-MCNC: 9.3 MG/DL — SIGNIFICANT CHANGE UP (ref 8.4–10.5)
CHLORIDE SERPL-SCNC: 101 MMOL/L — SIGNIFICANT CHANGE UP (ref 96–108)
CO2 SERPL-SCNC: 26 MMOL/L — SIGNIFICANT CHANGE UP (ref 22–31)
CREAT SERPL-MCNC: 0.82 MG/DL — SIGNIFICANT CHANGE UP (ref 0.5–1.3)
EGFR: 94 ML/MIN/1.73M2 — SIGNIFICANT CHANGE UP
EGFR: 94 ML/MIN/1.73M2 — SIGNIFICANT CHANGE UP
EOSINOPHIL # BLD AUTO: 0 K/UL — SIGNIFICANT CHANGE UP (ref 0–0.5)
EOSINOPHIL NFR BLD AUTO: 0 % — SIGNIFICANT CHANGE UP (ref 0–6)
GLUCOSE BLDC GLUCOMTR-MCNC: 109 MG/DL — HIGH (ref 70–99)
GLUCOSE SERPL-MCNC: 95 MG/DL — SIGNIFICANT CHANGE UP (ref 70–99)
HCT VFR BLD CALC: 44.3 % — SIGNIFICANT CHANGE UP (ref 34.5–45)
HGB BLD-MCNC: 15.9 G/DL — HIGH (ref 11.5–15.5)
IMM GRANULOCYTES NFR BLD AUTO: 0.4 % — SIGNIFICANT CHANGE UP (ref 0–0.9)
LYMPHOCYTES # BLD AUTO: 1.43 K/UL — SIGNIFICANT CHANGE UP (ref 1–3.3)
LYMPHOCYTES # BLD AUTO: 13 % — SIGNIFICANT CHANGE UP (ref 13–44)
MAGNESIUM SERPL-MCNC: 2.4 MG/DL — SIGNIFICANT CHANGE UP (ref 1.6–2.6)
MCHC RBC-ENTMCNC: 32 PG — SIGNIFICANT CHANGE UP (ref 27–34)
MCHC RBC-ENTMCNC: 35.9 G/DL — SIGNIFICANT CHANGE UP (ref 32–36)
MCV RBC AUTO: 89.1 FL — SIGNIFICANT CHANGE UP (ref 80–100)
MONOCYTES # BLD AUTO: 1.03 K/UL — HIGH (ref 0–0.9)
MONOCYTES NFR BLD AUTO: 9.3 % — SIGNIFICANT CHANGE UP (ref 2–14)
NEUTROPHILS # BLD AUTO: 8.5 K/UL — HIGH (ref 1.8–7.4)
NEUTROPHILS NFR BLD AUTO: 77.1 % — HIGH (ref 43–77)
NRBC BLD AUTO-RTO: 0 /100 WBCS — SIGNIFICANT CHANGE UP (ref 0–0)
PHOSPHATE SERPL-MCNC: 1.4 MG/DL — LOW (ref 2.5–4.5)
PLATELET # BLD AUTO: 279 K/UL — SIGNIFICANT CHANGE UP (ref 150–400)
POTASSIUM SERPL-MCNC: 3 MMOL/L — LOW (ref 3.5–5.3)
POTASSIUM SERPL-SCNC: 3 MMOL/L — LOW (ref 3.5–5.3)
PROT SERPL-MCNC: 7.5 G/DL — SIGNIFICANT CHANGE UP (ref 6–8.3)
RBC # BLD: 4.97 M/UL — SIGNIFICANT CHANGE UP (ref 3.8–5.2)
RBC # FLD: 11.3 % — SIGNIFICANT CHANGE UP (ref 10.3–14.5)
SODIUM SERPL-SCNC: 135 MMOL/L — SIGNIFICANT CHANGE UP (ref 135–145)
WBC # BLD: 11.02 K/UL — HIGH (ref 3.8–10.5)
WBC # FLD AUTO: 11.02 K/UL — HIGH (ref 3.8–10.5)

## 2025-07-28 RX ORDER — SODIUM CHLORIDE 9 G/1000ML
1000 INJECTION, SOLUTION INTRAVENOUS
Refills: 0 | Status: DISCONTINUED | OUTPATIENT
Start: 2025-07-28 | End: 2025-07-31

## 2025-07-28 RX ORDER — METOCLOPRAMIDE HCL 10 MG
10 TABLET ORAL ONCE
Refills: 0 | Status: COMPLETED | OUTPATIENT
Start: 2025-07-28 | End: 2025-07-28

## 2025-07-28 RX ADMIN — Medication 40 MILLIEQUIVALENT(S): at 13:07

## 2025-07-28 RX ADMIN — Medication 4 MILLIGRAM(S): at 22:25

## 2025-07-28 RX ADMIN — Medication 40 MILLIEQUIVALENT(S): at 17:34

## 2025-07-28 RX ADMIN — Medication 500 MILLIGRAM(S): at 13:07

## 2025-07-28 RX ADMIN — CEFTRIAXONE 100 MILLIGRAM(S): 500 INJECTION, POWDER, FOR SOLUTION INTRAMUSCULAR; INTRAVENOUS at 08:35

## 2025-07-28 RX ADMIN — Medication 10 MILLIGRAM(S): at 14:38

## 2025-07-28 RX ADMIN — Medication 500 MILLIGRAM(S): at 05:30

## 2025-07-28 RX ADMIN — Medication 40 MILLIGRAM(S): at 08:48

## 2025-07-28 RX ADMIN — Medication 500 MILLIGRAM(S): at 22:26

## 2025-07-28 RX ADMIN — Medication 4 MILLIGRAM(S): at 05:34

## 2025-07-28 RX ADMIN — Medication 4 MILLIGRAM(S): at 14:15

## 2025-07-28 RX ADMIN — SODIUM CHLORIDE 100 MILLILITER(S): 9 INJECTION, SOLUTION INTRAVENOUS at 17:29

## 2025-07-29 LAB
ANION GAP SERPL CALC-SCNC: 7 MMOL/L — SIGNIFICANT CHANGE UP (ref 5–17)
BUN SERPL-MCNC: 15 MG/DL — SIGNIFICANT CHANGE UP (ref 7–18)
CALCIUM SERPL-MCNC: 9.8 MG/DL — SIGNIFICANT CHANGE UP (ref 8.4–10.5)
CHLORIDE SERPL-SCNC: 101 MMOL/L — SIGNIFICANT CHANGE UP (ref 96–108)
CO2 SERPL-SCNC: 26 MMOL/L — SIGNIFICANT CHANGE UP (ref 22–31)
CREAT SERPL-MCNC: 0.9 MG/DL — SIGNIFICANT CHANGE UP (ref 0.5–1.3)
EGFR: 84 ML/MIN/1.73M2 — SIGNIFICANT CHANGE UP
EGFR: 84 ML/MIN/1.73M2 — SIGNIFICANT CHANGE UP
GLUCOSE SERPL-MCNC: 114 MG/DL — HIGH (ref 70–99)
HCT VFR BLD CALC: 45.3 % — HIGH (ref 34.5–45)
HGB BLD-MCNC: 16.5 G/DL — HIGH (ref 11.5–15.5)
MCHC RBC-ENTMCNC: 31.7 PG — SIGNIFICANT CHANGE UP (ref 27–34)
MCHC RBC-ENTMCNC: 36.4 G/DL — HIGH (ref 32–36)
MCV RBC AUTO: 86.9 FL — SIGNIFICANT CHANGE UP (ref 80–100)
NRBC BLD AUTO-RTO: 0 /100 WBCS — SIGNIFICANT CHANGE UP (ref 0–0)
PLATELET # BLD AUTO: 309 K/UL — SIGNIFICANT CHANGE UP (ref 150–400)
POTASSIUM SERPL-MCNC: 3.3 MMOL/L — LOW (ref 3.5–5.3)
POTASSIUM SERPL-SCNC: 3.3 MMOL/L — LOW (ref 3.5–5.3)
RBC # BLD: 5.21 M/UL — HIGH (ref 3.8–5.2)
RBC # FLD: 11.2 % — SIGNIFICANT CHANGE UP (ref 10.3–14.5)
SODIUM SERPL-SCNC: 134 MMOL/L — LOW (ref 135–145)
WBC # BLD: 11.97 K/UL — HIGH (ref 3.8–10.5)
WBC # FLD AUTO: 11.97 K/UL — HIGH (ref 3.8–10.5)

## 2025-07-29 RX ORDER — METOCLOPRAMIDE HCL 10 MG
10 TABLET ORAL EVERY 6 HOURS
Refills: 0 | Status: DISCONTINUED | OUTPATIENT
Start: 2025-07-29 | End: 2025-07-31

## 2025-07-29 RX ADMIN — Medication 500 MILLIGRAM(S): at 05:46

## 2025-07-29 RX ADMIN — Medication 20 MILLIEQUIVALENT(S): at 12:31

## 2025-07-29 RX ADMIN — Medication 10 MILLIGRAM(S): at 03:57

## 2025-07-29 RX ADMIN — Medication 10 MILLIGRAM(S): at 12:36

## 2025-07-29 RX ADMIN — CEFTRIAXONE 100 MILLIGRAM(S): 500 INJECTION, POWDER, FOR SOLUTION INTRAMUSCULAR; INTRAVENOUS at 11:11

## 2025-07-29 RX ADMIN — SODIUM CHLORIDE 100 MILLILITER(S): 9 INJECTION, SOLUTION INTRAVENOUS at 16:56

## 2025-07-29 RX ADMIN — Medication 40 MILLIGRAM(S): at 11:11

## 2025-07-29 RX ADMIN — Medication 500 MILLIGRAM(S): at 21:22

## 2025-07-29 RX ADMIN — Medication 500 MILLIGRAM(S): at 12:31

## 2025-07-30 DIAGNOSIS — R07.9 CHEST PAIN, UNSPECIFIED: ICD-10-CM

## 2025-07-30 DIAGNOSIS — E87.6 HYPOKALEMIA: ICD-10-CM

## 2025-07-30 DIAGNOSIS — Z75.8 OTHER PROBLEMS RELATED TO MEDICAL FACILITIES AND OTHER HEALTH CARE: ICD-10-CM

## 2025-07-30 LAB
ANION GAP SERPL CALC-SCNC: 6 MMOL/L — SIGNIFICANT CHANGE UP (ref 5–17)
BUN SERPL-MCNC: 11 MG/DL — SIGNIFICANT CHANGE UP (ref 7–18)
CALCIUM SERPL-MCNC: 8.9 MG/DL — SIGNIFICANT CHANGE UP (ref 8.4–10.5)
CHLORIDE SERPL-SCNC: 104 MMOL/L — SIGNIFICANT CHANGE UP (ref 96–108)
CO2 SERPL-SCNC: 25 MMOL/L — SIGNIFICANT CHANGE UP (ref 22–31)
CREAT SERPL-MCNC: 0.68 MG/DL — SIGNIFICANT CHANGE UP (ref 0.5–1.3)
EGFR: 114 ML/MIN/1.73M2 — SIGNIFICANT CHANGE UP
EGFR: 114 ML/MIN/1.73M2 — SIGNIFICANT CHANGE UP
GLUCOSE SERPL-MCNC: 79 MG/DL — SIGNIFICANT CHANGE UP (ref 70–99)
HCT VFR BLD CALC: 38.8 % — SIGNIFICANT CHANGE UP (ref 34.5–45)
HGB BLD-MCNC: 13.9 G/DL — SIGNIFICANT CHANGE UP (ref 11.5–15.5)
MAGNESIUM SERPL-MCNC: 2.2 MG/DL — SIGNIFICANT CHANGE UP (ref 1.6–2.6)
MCHC RBC-ENTMCNC: 31.7 PG — SIGNIFICANT CHANGE UP (ref 27–34)
MCHC RBC-ENTMCNC: 35.8 G/DL — SIGNIFICANT CHANGE UP (ref 32–36)
MCV RBC AUTO: 88.4 FL — SIGNIFICANT CHANGE UP (ref 80–100)
NRBC BLD AUTO-RTO: 0 /100 WBCS — SIGNIFICANT CHANGE UP (ref 0–0)
PHOSPHATE SERPL-MCNC: 2.6 MG/DL — SIGNIFICANT CHANGE UP (ref 2.5–4.5)
PLATELET # BLD AUTO: 212 K/UL — SIGNIFICANT CHANGE UP (ref 150–400)
POTASSIUM SERPL-MCNC: 3.4 MMOL/L — LOW (ref 3.5–5.3)
POTASSIUM SERPL-SCNC: 3.4 MMOL/L — LOW (ref 3.5–5.3)
RBC # BLD: 4.39 M/UL — SIGNIFICANT CHANGE UP (ref 3.8–5.2)
RBC # FLD: 11.3 % — SIGNIFICANT CHANGE UP (ref 10.3–14.5)
SODIUM SERPL-SCNC: 135 MMOL/L — SIGNIFICANT CHANGE UP (ref 135–145)
WBC # BLD: 8.39 K/UL — SIGNIFICANT CHANGE UP (ref 3.8–10.5)
WBC # FLD AUTO: 8.39 K/UL — SIGNIFICANT CHANGE UP (ref 3.8–10.5)

## 2025-07-30 RX ORDER — METOPROLOL SUCCINATE 50 MG/1
5 TABLET, EXTENDED RELEASE ORAL ONCE
Refills: 0 | Status: COMPLETED | OUTPATIENT
Start: 2025-07-30 | End: 2025-07-30

## 2025-07-30 RX ORDER — DEXAMETHASONE 0.5 MG/1
6 TABLET ORAL EVERY 12 HOURS
Refills: 0 | Status: DISCONTINUED | OUTPATIENT
Start: 2025-07-30 | End: 2025-07-31

## 2025-07-30 RX ADMIN — Medication 10 MILLIGRAM(S): at 09:05

## 2025-07-30 RX ADMIN — Medication 500 MILLIGRAM(S): at 22:23

## 2025-07-30 RX ADMIN — Medication 40 MILLIEQUIVALENT(S): at 11:56

## 2025-07-30 RX ADMIN — Medication 500 MILLIGRAM(S): at 06:05

## 2025-07-30 RX ADMIN — CEFTRIAXONE 100 MILLIGRAM(S): 500 INJECTION, POWDER, FOR SOLUTION INTRAMUSCULAR; INTRAVENOUS at 09:05

## 2025-07-30 RX ADMIN — SODIUM CHLORIDE 100 MILLILITER(S): 9 INJECTION, SOLUTION INTRAVENOUS at 06:05

## 2025-07-30 RX ADMIN — Medication 1 DOSE(S): at 12:12

## 2025-07-30 RX ADMIN — ENOXAPARIN SODIUM 40 MILLIGRAM(S): 100 INJECTION SUBCUTANEOUS at 17:31

## 2025-07-30 RX ADMIN — Medication 40 MILLIGRAM(S): at 09:05

## 2025-07-30 RX ADMIN — METOPROLOL SUCCINATE 5 MILLIGRAM(S): 50 TABLET, EXTENDED RELEASE ORAL at 14:08

## 2025-07-30 RX ADMIN — DEXAMETHASONE 6 MILLIGRAM(S): 0.5 TABLET ORAL at 11:57

## 2025-07-30 RX ADMIN — Medication 1 DOSE(S): at 22:24

## 2025-07-30 RX ADMIN — SODIUM CHLORIDE 100 MILLILITER(S): 9 INJECTION, SOLUTION INTRAVENOUS at 11:54

## 2025-07-31 ENCOUNTER — TRANSCRIPTION ENCOUNTER (OUTPATIENT)
Age: 40
End: 2025-07-31

## 2025-07-31 VITALS
RESPIRATION RATE: 18 BRPM | HEART RATE: 82 BPM | OXYGEN SATURATION: 98 % | TEMPERATURE: 99 F | DIASTOLIC BLOOD PRESSURE: 81 MMHG | SYSTOLIC BLOOD PRESSURE: 121 MMHG

## 2025-07-31 PROCEDURE — 82962 GLUCOSE BLOOD TEST: CPT

## 2025-07-31 PROCEDURE — 87637 SARSCOV2&INF A&B&RSV AMP PRB: CPT

## 2025-07-31 PROCEDURE — 85027 COMPLETE CBC AUTOMATED: CPT

## 2025-07-31 PROCEDURE — 83690 ASSAY OF LIPASE: CPT

## 2025-07-31 PROCEDURE — 85025 COMPLETE CBC W/AUTO DIFF WBC: CPT

## 2025-07-31 PROCEDURE — 80053 COMPREHEN METABOLIC PANEL: CPT

## 2025-07-31 PROCEDURE — 96375 TX/PRO/DX INJ NEW DRUG ADDON: CPT

## 2025-07-31 PROCEDURE — 81001 URINALYSIS AUTO W/SCOPE: CPT

## 2025-07-31 PROCEDURE — 71045 X-RAY EXAM CHEST 1 VIEW: CPT

## 2025-07-31 PROCEDURE — 99285 EMERGENCY DEPT VISIT HI MDM: CPT

## 2025-07-31 PROCEDURE — 80048 BASIC METABOLIC PNL TOTAL CA: CPT

## 2025-07-31 PROCEDURE — 83605 ASSAY OF LACTIC ACID: CPT

## 2025-07-31 PROCEDURE — 85652 RBC SED RATE AUTOMATED: CPT

## 2025-07-31 PROCEDURE — 86140 C-REACTIVE PROTEIN: CPT

## 2025-07-31 PROCEDURE — 93005 ELECTROCARDIOGRAM TRACING: CPT

## 2025-07-31 PROCEDURE — 96376 TX/PRO/DX INJ SAME DRUG ADON: CPT

## 2025-07-31 PROCEDURE — 84702 CHORIONIC GONADOTROPIN TEST: CPT

## 2025-07-31 PROCEDURE — 87040 BLOOD CULTURE FOR BACTERIA: CPT

## 2025-07-31 PROCEDURE — 84484 ASSAY OF TROPONIN QUANT: CPT

## 2025-07-31 PROCEDURE — 80307 DRUG TEST PRSMV CHEM ANLYZR: CPT

## 2025-07-31 PROCEDURE — 84100 ASSAY OF PHOSPHORUS: CPT

## 2025-07-31 PROCEDURE — 83735 ASSAY OF MAGNESIUM: CPT

## 2025-07-31 PROCEDURE — 94640 AIRWAY INHALATION TREATMENT: CPT

## 2025-07-31 PROCEDURE — 74177 CT ABD & PELVIS W/CONTRAST: CPT

## 2025-07-31 PROCEDURE — 96374 THER/PROPH/DIAG INJ IV PUSH: CPT

## 2025-07-31 PROCEDURE — 36415 COLL VENOUS BLD VENIPUNCTURE: CPT

## 2025-07-31 PROCEDURE — 85730 THROMBOPLASTIN TIME PARTIAL: CPT

## 2025-07-31 PROCEDURE — 85610 PROTHROMBIN TIME: CPT

## 2025-07-31 RX ORDER — CIPROFLOXACIN HCL 250 MG
1 TABLET ORAL
Qty: 4 | Refills: 0
Start: 2025-07-31 | End: 2025-08-01

## 2025-07-31 RX ORDER — PROPRANOLOL HCL 60 MG
1 TABLET ORAL
Qty: 90 | Refills: 0
Start: 2025-07-31 | End: 2025-08-29

## 2025-07-31 RX ORDER — METRONIDAZOLE 250 MG
1 TABLET ORAL
Qty: 6 | Refills: 0
Start: 2025-07-31 | End: 2025-08-01

## 2025-07-31 RX ADMIN — CEFTRIAXONE 100 MILLIGRAM(S): 500 INJECTION, POWDER, FOR SOLUTION INTRAMUSCULAR; INTRAVENOUS at 07:39

## 2025-07-31 RX ADMIN — Medication 500 MILLIGRAM(S): at 06:07

## 2025-07-31 RX ADMIN — Medication 40 MILLIGRAM(S): at 09:09
